# Patient Record
Sex: FEMALE | Race: WHITE | NOT HISPANIC OR LATINO
[De-identification: names, ages, dates, MRNs, and addresses within clinical notes are randomized per-mention and may not be internally consistent; named-entity substitution may affect disease eponyms.]

---

## 2019-12-18 ENCOUNTER — APPOINTMENT (OUTPATIENT)
Dept: INTERNAL MEDICINE | Facility: CLINIC | Age: 64
End: 2019-12-18
Payer: COMMERCIAL

## 2019-12-18 VITALS
DIASTOLIC BLOOD PRESSURE: 72 MMHG | SYSTOLIC BLOOD PRESSURE: 118 MMHG | HEART RATE: 67 BPM | WEIGHT: 182 LBS | BODY MASS INDEX: 31.07 KG/M2 | OXYGEN SATURATION: 99 % | HEIGHT: 64 IN

## 2019-12-18 DIAGNOSIS — Z82.49 FAMILY HISTORY OF ISCHEMIC HEART DISEASE AND OTHER DISEASES OF THE CIRCULATORY SYSTEM: ICD-10-CM

## 2019-12-18 DIAGNOSIS — Z82.5 FAMILY HISTORY OF ASTHMA AND OTHER CHRONIC LOWER RESPIRATORY DISEASES: ICD-10-CM

## 2019-12-18 DIAGNOSIS — Z82.3 FAMILY HISTORY OF STROKE: ICD-10-CM

## 2019-12-18 DIAGNOSIS — Z79.899 OTHER LONG TERM (CURRENT) DRUG THERAPY: ICD-10-CM

## 2019-12-18 PROCEDURE — 93000 ELECTROCARDIOGRAM COMPLETE: CPT

## 2019-12-18 PROCEDURE — G0442 ANNUAL ALCOHOL SCREEN 15 MIN: CPT

## 2019-12-18 PROCEDURE — 99386 PREV VISIT NEW AGE 40-64: CPT | Mod: 25

## 2019-12-18 PROCEDURE — G0444 DEPRESSION SCREEN ANNUAL: CPT

## 2019-12-18 NOTE — REVIEW OF SYSTEMS
[Patient Intake Form Reviewed] : Patient intake form was reviewed [Negative] : Heme/Lymph [Fatigue] : fatigue [Cough] : cough [de-identified] : numbness in hands from CTS (?chemo in the past) [FreeTextEntry6] : cough is occasional

## 2019-12-18 NOTE — ASSESSMENT
[FreeTextEntry1] : 1.  [facility] - Mammo, pap and colonoscopy are UTD.  Immunizations are UTD and she is awaiting the second Shingrix from her pharmacy.  She is turning 65 next month and will get a Prevnar then.  Uses seatbelts, has smoke detectors etc.  To remian active.\par 2.  HTN - BP is acceptable.  Continue current management.  Rx RN\par 3.  Hyperlipidemia - check lipids today.\par 4.  Numbness of fingers and toes on occasion - ?related to prior chemo, CTS.  Check TFT's\par 5.  MGUS - IgA.  Following at [facility].  Observation at this time.\par 6.  H/O ovarian Ca\par 7.  RTO 6 mos or prn.

## 2019-12-18 NOTE — HISTORY OF PRESENT ILLNESS
[de-identified] : 64-year-old female with a history of ovarian cancer diagnosed in 2/99 status post MALINA/BSO followed by 6 cycles of Taxol/carboplatinum, MGUS diagnosed 7/19, bilateral hip arthritis status post hip replacements, hyperlipidemia and hypertension who comes in today to establish care.  She has no specific complaints.  She is followed by a survivorship program in gynecology where she gets her mammograms done and a Ca 125 once a year.  She reports both of them were okay although the Ca 125 has trended up a little bit but it is still in the acceptable range.\par \par She went to heme-onc at [facility] in July where she had a bone marrow biopsy and a PET scan.  She was found to have a band that was on IgA band.  On bone marrow biopsy there was 8% cells which is under the criteria for calling it multiple myeloma.  She is following with them every 3 months.

## 2019-12-18 NOTE — HEALTH RISK ASSESSMENT
[Very Good] : ~his/her~  mood as very good [No falls in past year] : Patient reported no falls in the past year [Patient reported mammogram was normal] : Patient reported mammogram was normal [Hepatitis C test declined] : Hepatitis C test declined [HIV test declined] : HIV test declined [de-identified] : Exercising regularly [de-identified] : Occasional [] : No [PapSmearDate] : 4/19 [MammogramDate] : 11/19 [PapSmearComments] : Thinks it was normal [HIVComments] : Has been tested for HIV in the past

## 2019-12-19 ENCOUNTER — TRANSCRIPTION ENCOUNTER (OUTPATIENT)
Age: 64
End: 2019-12-19

## 2020-02-18 LAB
25(OH)D3 SERPL-MCNC: 21.2 NG/ML
ALBUMIN SERPL ELPH-MCNC: 4.7 G/DL
ALP BLD-CCNC: 81 U/L
ALT SERPL-CCNC: 38 U/L
ANION GAP SERPL CALC-SCNC: 13 MMOL/L
AST SERPL-CCNC: 30 U/L
BASOPHILS # BLD AUTO: 0.02 K/UL
BASOPHILS NFR BLD AUTO: 0.3 %
BILIRUB SERPL-MCNC: 0.6 MG/DL
BUN SERPL-MCNC: 18 MG/DL
CALCIUM SERPL-MCNC: 10.3 MG/DL
CHLORIDE SERPL-SCNC: 100 MMOL/L
CHOLEST SERPL-MCNC: 237 MG/DL
CHOLEST/HDLC SERPL: 2.4 RATIO
CO2 SERPL-SCNC: 26 MMOL/L
CREAT SERPL-MCNC: 0.78 MG/DL
EOSINOPHIL # BLD AUTO: 0.06 K/UL
EOSINOPHIL NFR BLD AUTO: 1 %
ESTIMATED AVERAGE GLUCOSE: 103 MG/DL
GLUCOSE SERPL-MCNC: 86 MG/DL
HBA1C MFR BLD HPLC: 5.2 %
HCT VFR BLD CALC: 43.5 %
HCV AB SER QL: NONREACTIVE
HCV S/CO RATIO: 0.08 S/CO
HDLC SERPL-MCNC: 97 MG/DL
HGB BLD-MCNC: 13.5 G/DL
IMM GRANULOCYTES NFR BLD AUTO: 0.2 %
LDLC SERPL CALC-MCNC: 124 MG/DL
LYMPHOCYTES # BLD AUTO: 2.4 K/UL
LYMPHOCYTES NFR BLD AUTO: 41.5 %
MAN DIFF?: NORMAL
MCHC RBC-ENTMCNC: 30.3 PG
MCHC RBC-ENTMCNC: 31 GM/DL
MCV RBC AUTO: 97.5 FL
MONOCYTES # BLD AUTO: 0.49 K/UL
MONOCYTES NFR BLD AUTO: 8.5 %
NEUTROPHILS # BLD AUTO: 2.8 K/UL
NEUTROPHILS NFR BLD AUTO: 48.5 %
PLATELET # BLD AUTO: 255 K/UL
POTASSIUM SERPL-SCNC: 4.6 MMOL/L
PROT SERPL-MCNC: 7.3 G/DL
RBC # BLD: 4.46 M/UL
RBC # FLD: 13.1 %
SODIUM SERPL-SCNC: 139 MMOL/L
T4 FREE SERPL-MCNC: 1.1 NG/DL
TRIGL SERPL-MCNC: 80 MG/DL
TSH SERPL-ACNC: 1.57 UIU/ML
WBC # FLD AUTO: 5.78 K/UL

## 2020-07-07 ENCOUNTER — APPOINTMENT (OUTPATIENT)
Dept: GASTROENTEROLOGY | Facility: CLINIC | Age: 65
End: 2020-07-07
Payer: COMMERCIAL

## 2020-07-07 VITALS
HEIGHT: 64 IN | HEART RATE: 71 BPM | WEIGHT: 186 LBS | SYSTOLIC BLOOD PRESSURE: 135 MMHG | TEMPERATURE: 98.3 F | BODY MASS INDEX: 31.76 KG/M2 | DIASTOLIC BLOOD PRESSURE: 82 MMHG

## 2020-07-07 DIAGNOSIS — K57.30 DIVERTICULOSIS OF LARGE INTESTINE W/OUT PERFORATION OR ABSCESS W/OUT BLEEDING: ICD-10-CM

## 2020-07-07 DIAGNOSIS — Z86.69 PERSONAL HISTORY OF OTHER DISEASES OF THE NERVOUS SYSTEM AND SENSE ORGANS: ICD-10-CM

## 2020-07-07 DIAGNOSIS — R19.8 OTHER SPECIFIED SYMPTOMS AND SIGNS INVOLVING THE DIGESTIVE SYSTEM AND ABDOMEN: ICD-10-CM

## 2020-07-07 DIAGNOSIS — Z86.010 PERSONAL HISTORY OF COLONIC POLYPS: ICD-10-CM

## 2020-07-07 DIAGNOSIS — Z80.3 FAMILY HISTORY OF MALIGNANT NEOPLASM OF BREAST: ICD-10-CM

## 2020-07-07 DIAGNOSIS — R12 HEARTBURN: ICD-10-CM

## 2020-07-07 DIAGNOSIS — K62.5 HEMORRHAGE OF ANUS AND RECTUM: ICD-10-CM

## 2020-07-07 DIAGNOSIS — Z85.43 PERSONAL HISTORY OF MALIGNANT NEOPLASM OF OVARY: ICD-10-CM

## 2020-07-07 DIAGNOSIS — M19.90 UNSPECIFIED OSTEOARTHRITIS, UNSPECIFIED SITE: ICD-10-CM

## 2020-07-07 PROCEDURE — 99203 OFFICE O/P NEW LOW 30 MIN: CPT

## 2020-07-07 PROCEDURE — 82274 ASSAY TEST FOR BLOOD FECAL: CPT | Mod: QW

## 2020-07-07 RX ORDER — CALCIUM CARBONATE 500 MG/1
TABLET, CHEWABLE ORAL
Refills: 0 | Status: ACTIVE | COMMUNITY

## 2020-07-07 RX ORDER — HYDROCHLOROTHIAZIDE 12.5 MG/1
12.5 TABLET ORAL
Qty: 36 | Refills: 3 | Status: DISCONTINUED | COMMUNITY
Start: 2019-12-18 | End: 2020-07-07

## 2020-07-07 NOTE — CONSULT LETTER
[Dear  ___] : Dear  [unfilled], [Consult Letter:] : I had the pleasure of evaluating your patient, [unfilled]. [Please see my note below.] : Please see my note below. [Consult Closing:] : Thank you very much for allowing me to participate in the care of this patient.  If you have any questions, please do not hesitate to contact me. [Sincerely,] : Sincerely, [FreeTextEntry3] : Matt Durant M.D.\par

## 2020-07-07 NOTE — PHYSICAL EXAM
[General Appearance - Alert] : alert [General Appearance - Well Nourished] : well nourished [General Appearance - In No Acute Distress] : in no acute distress [General Appearance - Well Developed] : well developed [Sclera] : the sclera and conjunctiva were normal [Neck Appearance] : the appearance of the neck was normal [Neck Cervical Mass (___cm)] : no neck mass was observed [Jugular Venous Distention Increased] : there was no jugular-venous distention [Thyroid Nodule] : there were no palpable thyroid nodules [Thyroid Diffuse Enlargement] : the thyroid was not enlarged [Heart Rate And Rhythm] : heart rate was normal and rhythm regular [Auscultation Breath Sounds / Voice Sounds] : lungs were clear to auscultation bilaterally [Heart Sounds Gallop] : no gallops [Heart Sounds] : normal S1 and S2 [Heart Sounds Pericardial Friction Rub] : no pericardial rub [Murmurs] : no murmurs [Edema] : there was no peripheral edema [Bowel Sounds] : normal bowel sounds [Full Pulse] : the pedal pulses are present [Abdomen Soft] : soft [Abdomen Tenderness] : non-tender [Abdomen Mass (___ Cm)] : no abdominal mass palpated [Abdomen Hernia] : no hernia was discovered [Normal Sphincter Tone] : normal sphincter tone [No Rectal Mass] : no rectal mass [Internal Hemorrhoid] : internal hemorrhoids [Supraclavicular Lymph Nodes Enlarged Bilaterally] : supraclavicular [Inguinal Lymph Nodes Enlarged Bilaterally] : inguinal [Femoral Lymph Nodes Enlarged Bilaterally] : femoral [Nail Clubbing] : no clubbing  or cyanosis of the fingernails [Skin Color & Pigmentation] : normal skin color and pigmentation [Skin Turgor] : normal skin turgor [] : no rash [Oriented To Time, Place, And Person] : oriented to person, place, and time [Impaired Insight] : insight and judgment were intact [Affect] : the affect was normal [External Hemorrhoid] : no external hemorrhoids [Occult Blood Positive] : stool was negative for occult blood [FreeTextEntry1] : FIT negative

## 2020-07-07 NOTE — REVIEW OF SYSTEMS
[Negative] : Heme/Lymph [As Noted in HPI] : as noted in HPI [Abdominal Pain] : abdominal pain [Diarrhea] : diarrhea

## 2020-07-07 NOTE — ASSESSMENT
[FreeTextEntry1] : 1.  Recent abdominal pain, diarrhea with bloody mucus suggests diagnosis of acute, self-limited colitis (favor ischemic>infection); tubular adenoma, sigmoid diverticulosis at last colonoscopy November 2013--rule out IBD, metachronous colorectal neoplasia.\par 2.  History of GERD; antral gastritis, mild reflux esophagitis at EGD October 2015.\par 3.  History of ovarian cancer (poorly differentiated clear-cell) 1999, status post total hysterectomy and chemotherapy.\par 4.  Hypertension.\par 5.  Obesity.\par 6.  Osteoarthritis, status post bilateral hip replacements.\par 7.  MGUS (IgA spike).\par 8.  Hypovitaminosis D.\par 9.  Hyperlipidemia.\par 10.  Status post iridectomy.\par 11.  Allergic to ampicillin, Bactrim.\par \par Plan:\par 1.  She will retrieve last week's labs for my review.\par 2.  Proceed with repeat colonoscopy-- Procedure, rationale, alternatives, material risks, anesthesia plan, and MiraLax prep instructions were reviewed and brochure given. Continue holding ASA for now.\par 3.  Continue Tums as needed.\par

## 2020-07-07 NOTE — HISTORY OF PRESENT ILLNESS
[FreeTextEntry1] : On 6/28, after eating a salad and shrimp burger, Estefanía noted bloating, and mid-abdominal cramping; then, she had a normal BM, followed by diarrhea with some blood and mucus.  She noted more blood and mucus the following morning, but since then, feels about back to baseline.  She has been noting bloating from time to time recently.  A small tubular adenoma was removed at last colonoscopy November 2013.  She had been taking aspirin 325 mg 3 times weekly for arthritis, but stopped this at onset of bleeding.  She reports heartburn only after ingesting red wine or tomato sauce, for which she would take Tums, with prompt relief.  She was diagnosed with MGUS (IgA spike) last year.  She denied associated fever or other family members who ate the same food being similarly affected.

## 2020-07-26 ENCOUNTER — APPOINTMENT (OUTPATIENT)
Dept: DISASTER EMERGENCY | Facility: CLINIC | Age: 65
End: 2020-07-26

## 2020-07-27 LAB — SARS-COV-2 N GENE NPH QL NAA+PROBE: NOT DETECTED

## 2020-07-29 ENCOUNTER — APPOINTMENT (OUTPATIENT)
Dept: GASTROENTEROLOGY | Facility: CLINIC | Age: 65
End: 2020-07-29
Payer: COMMERCIAL

## 2020-07-29 ENCOUNTER — LABORATORY RESULT (OUTPATIENT)
Age: 65
End: 2020-07-29

## 2020-07-29 PROCEDURE — 45378 DIAGNOSTIC COLONOSCOPY: CPT

## 2020-07-29 PROCEDURE — 43239 EGD BIOPSY SINGLE/MULTIPLE: CPT | Mod: 59

## 2021-02-08 ENCOUNTER — APPOINTMENT (OUTPATIENT)
Dept: INTERNAL MEDICINE | Facility: CLINIC | Age: 66
End: 2021-02-08
Payer: COMMERCIAL

## 2021-02-08 ENCOUNTER — NON-APPOINTMENT (OUTPATIENT)
Age: 66
End: 2021-02-08

## 2021-02-08 VITALS
BODY MASS INDEX: 31.71 KG/M2 | SYSTOLIC BLOOD PRESSURE: 110 MMHG | WEIGHT: 188 LBS | HEART RATE: 65 BPM | DIASTOLIC BLOOD PRESSURE: 70 MMHG | HEIGHT: 64.5 IN | OXYGEN SATURATION: 98 %

## 2021-02-08 DIAGNOSIS — E55.9 VITAMIN D DEFICIENCY, UNSPECIFIED: ICD-10-CM

## 2021-02-08 DIAGNOSIS — R07.89 OTHER CHEST PAIN: ICD-10-CM

## 2021-02-08 DIAGNOSIS — E66.9 OBESITY, UNSPECIFIED: ICD-10-CM

## 2021-02-08 DIAGNOSIS — Z00.00 ENCOUNTER FOR GENERAL ADULT MEDICAL EXAMINATION W/OUT ABNORMAL FINDINGS: ICD-10-CM

## 2021-02-08 DIAGNOSIS — R74.8 ABNORMAL LEVELS OF OTHER SERUM ENZYMES: ICD-10-CM

## 2021-02-08 DIAGNOSIS — Z92.21 PERSONAL HISTORY OF ANTINEOPLASTIC CHEMOTHERAPY: ICD-10-CM

## 2021-02-08 PROCEDURE — 99072 ADDL SUPL MATRL&STAF TM PHE: CPT

## 2021-02-08 PROCEDURE — 93000 ELECTROCARDIOGRAM COMPLETE: CPT | Mod: 59

## 2021-02-08 PROCEDURE — G0444 DEPRESSION SCREEN ANNUAL: CPT

## 2021-02-08 PROCEDURE — 99213 OFFICE O/P EST LOW 20 MIN: CPT | Mod: 25

## 2021-02-08 PROCEDURE — 99397 PER PM REEVAL EST PAT 65+ YR: CPT | Mod: 25

## 2021-02-08 RX ORDER — CLINDAMYCIN HYDROCHLORIDE 150 MG/1
150 CAPSULE ORAL
Qty: 20 | Refills: 0 | Status: ACTIVE | COMMUNITY
Start: 2019-10-01

## 2021-02-08 NOTE — HISTORY OF PRESENT ILLNESS
[de-identified] : 64-year-old female with a history of ovarian cancer diagnosed in 2/99 status post MALINA/BSO followed by 6 cycles of Taxol/carboplatinum, MGUS diagnosed 7/19, bilateral hip arthritis status post hip replacements, hyperlipidemia and hypertension who comes in today to establish care.  She has no specific complaints.  She is followed by a survivorship program in gynecology where she gets her mammograms done and a Ca 125 once a year.  She reports both of them were okay although the Ca 125 has trended up a little bit but it is still in the acceptable range.\par \par She went to heme-onc at [facility] in July, 2019 where she had a bone marrow biopsy and a PET scan.  She was found to have a band that was on IgA band.  On bone marrow biopsy there was 8% cells which is under the criteria for calling it multiple myeloma.  She is following with them every 3 months.\par \par Heme-onc did a bone density on her in November which showed osteoporosis.  She was on Fosamax in the past but did not tolerated as it gave her a lot of bone and muscle aches.  She is taking a calcium and vitamin D supplement and will speak with her oncologist in April about infusion treatments for the osteoporosis.  In addition, she chronically runs an elevated CPK.\par \par In addition, she has been having episodes of chest tightness and discomfort more like a pressure as if something was sitting on her chest.  This comes and goes and is not necessarily related to activity.  Sometimes she can go up a flight of stairs and will feel it but yesterday she shoveled and had no symptoms.

## 2021-02-08 NOTE — REVIEW OF SYSTEMS
[Patient Intake Form Reviewed] : Patient intake form was reviewed [Fatigue] : fatigue [Lower Ext Edema] : lower extremity edema [Cough] : no cough [Negative] : Respiratory [FreeTextEntry5] : See HPI [FreeTextEntry9] : Feels achy.  Feels pain in her medial epicondyles region. [de-identified] : numbness in hands from CTS (?chemo in the past)

## 2021-02-08 NOTE — HEALTH RISK ASSESSMENT
[Very Good] : ~his/her~  mood as very good [No falls in past year] : Patient reported no falls in the past year [Patient reported mammogram was normal] : Patient reported mammogram was normal [HIV test declined] : HIV test declined [Hepatitis C test declined] : Hepatitis C test declined [With Family] : lives with family [None] : None [Employed] : employed [Fully functional (bathing, dressing, toileting, transferring, walking, feeding)] : Fully functional (bathing, dressing, toileting, transferring, walking, feeding) [Fully functional (using the telephone, shopping, preparing meals, housekeeping, doing laundry, using] : Fully functional and needs no help or supervision to perform IADLs (using the telephone, shopping, preparing meals, housekeeping, doing laundry, using transportation, managing medications and managing finances) [Smoke Detector] : smoke detector [Carbon Monoxide Detector] : carbon monoxide detector [Seat Belt] :  uses seat belt [Name: ___] : Health Care Proxy's Name: [unfilled]  [Relationship: ___] : Relationship: [unfilled] [] : No [No] : In the past 12 months have you used drugs other than those required for medical reasons? No [1] : 1) Little interest or pleasure doing things for several days (1) [0] : 2) Feeling down, depressed, or hopeless: Not at all (0) [de-identified] : Heme/unk, GI.  GI records were read and reviewed.  Recent bone density and mammogram and labs from October reviewed that were done at [facility]. [de-identified] : Occasional [de-identified] : Not Exercising regularly - in a funk,  Gets bored.  Working from home and taking care of her mom. [[id number]] : 1 [Patient reported bone density results were abnormal] : Patient reported bone density results were abnormal [Reports changes in hearing] : Reports no changes in hearing [Reports changes in vision] : Reports no changes in vision [Reports changes in dental health] : Reports no changes in dental health [Guns at Home] : no guns at home [Sunscreen] : does not use sunscreen [MammogramDate] : 11/20 [MammogramComments] : BI-RADS 2. [PapSmearDate] : 4/19 [PapSmearComments] : Thinks it was normal [BoneDensityDate] : 11/20 [BoneDensityComments] : Osteoporosis [HIVComments] : Has been tested for HIV in the past [FreeTextEntry2] :  [facility] [AdvancecareDate] : 02/21 [de-identified] : Is due for an eye exam

## 2021-02-08 NOTE — ASSESSMENT
[FreeTextEntry1] : 1.  [facility] - Mammo, pap, BMD and colonoscopy are UTD.  Immunizations are UTD and she is awaiting the second Covid vaccine which is scheduled for tomorrow..  She will need a booster for her Pneumovax but will defer at this time because of the Covid vaccination.  Had a flu shot in October.  Uses seatbelts, has smoke detectors etc.  To remian active.\par 2.  HTN - BP is acceptable.  Continue current management.  Rx RN\par 3.  Hyperlipidemia - check lipids today.\par 4.  Numbness of fingers and toes on occasion - ?related to prior chemo, CTS.  Check TFT's\par 5.  MGUS - IgA.  Following at [facility].  Observation at this time.\par 6.  H/O ovarian Ca now with episodes of chest discomfort and heaviness tightness.  EKG normal but she has had chemotherapy in the past.  Will send for a stress echo for further evaluation.  Premature coronary artery disease has been seen in individuals who has had chemotherapy, especially with anthracycline derivatives.\par 7.  Osteoporosis on calcium and vitamin D.  Did not tolerate a bisphosphonate in the past and thinks she will decline request or other types of treatment.  Requesting that I check her CK.  She will discuss further with her oncologist.\par 8.  Labs as per plan\par 9.  RTO 6 mos or prn.

## 2021-02-08 NOTE — PHYSICAL EXAM
[No Acute Distress] : no acute distress [Well Nourished] : well nourished [Well Developed] : well developed [Well-Appearing] : well-appearing [Normal Sclera/Conjunctiva] : normal sclera/conjunctiva [PERRL] : pupils equal round and reactive to light [EOMI] : extraocular movements intact [Normal Outer Ear/Nose] : the outer ears and nose were normal in appearance [No JVD] : no jugular venous distention [No Lymphadenopathy] : no lymphadenopathy [Supple] : supple [Thyroid Normal, No Nodules] : the thyroid was normal and there were no nodules present [No Respiratory Distress] : no respiratory distress  [No Accessory Muscle Use] : no accessory muscle use [Clear to Auscultation] : lungs were clear to auscultation bilaterally [Normal Rate] : normal rate  [Regular Rhythm] : with a regular rhythm [Normal S1, S2] : normal S1 and S2 [No Murmur] : no murmur heard [No Carotid Bruits] : no carotid bruits [No Abdominal Bruit] : a ~M bruit was not heard ~T in the abdomen [No Varicosities] : no varicosities [Pedal Pulses Present] : the pedal pulses are present [No Edema] : there was no peripheral edema [No Palpable Aorta] : no palpable aorta [No Extremity Clubbing/Cyanosis] : no extremity clubbing/cyanosis [No Nipple Discharge] : no nipple discharge [Non Tender] : non-tender [Soft] : abdomen soft [Non-distended] : non-distended [No Masses] : no abdominal mass palpated [No HSM] : no HSM [Normal Bowel Sounds] : normal bowel sounds [Normal Supraclavicular Nodes] : no supraclavicular lymphadenopathy [Normal Posterior Cervical Nodes] : no posterior cervical lymphadenopathy [Normal Anterior Cervical Nodes] : no anterior cervical lymphadenopathy [No CVA Tenderness] : no CVA  tenderness [No Spinal Tenderness] : no spinal tenderness [No Joint Swelling] : no joint swelling [Grossly Normal Strength/Tone] : grossly normal strength/tone [No Rash] : no rash [Coordination Grossly Intact] : coordination grossly intact [No Focal Deficits] : no focal deficits [Normal Gait] : normal gait [Deep Tendon Reflexes (DTR)] : deep tendon reflexes were 2+ and symmetric [Normal Affect] : the affect was normal [Alert and Oriented x3] : oriented to person, place, and time [Normal Insight/Judgement] : insight and judgment were intact

## 2021-02-17 LAB
25(OH)D3 SERPL-MCNC: 24 NG/ML
ALBUMIN SERPL ELPH-MCNC: 4.7 G/DL
ALP BLD-CCNC: 81 U/L
ALT SERPL-CCNC: 27 U/L
ANION GAP SERPL CALC-SCNC: 12 MMOL/L
AST SERPL-CCNC: 24 U/L
BILIRUB SERPL-MCNC: 0.6 MG/DL
BUN SERPL-MCNC: 19 MG/DL
CALCIUM SERPL-MCNC: 9.8 MG/DL
CHLORIDE SERPL-SCNC: 103 MMOL/L
CHOLEST SERPL-MCNC: 222 MG/DL
CK SERPL-CCNC: 330 U/L
CO2 SERPL-SCNC: 24 MMOL/L
CREAT SERPL-MCNC: 0.81 MG/DL
ESTIMATED AVERAGE GLUCOSE: 105 MG/DL
GLUCOSE SERPL-MCNC: 99 MG/DL
HBA1C MFR BLD HPLC: 5.3 %
HDLC SERPL-MCNC: 95 MG/DL
LDLC SERPL CALC-MCNC: 112 MG/DL
NONHDLC SERPL-MCNC: 127 MG/DL
POTASSIUM SERPL-SCNC: 4.3 MMOL/L
PROT SERPL-MCNC: 7.1 G/DL
SODIUM SERPL-SCNC: 139 MMOL/L
T4 FREE SERPL-MCNC: 1 NG/DL
TRIGL SERPL-MCNC: 74 MG/DL
TSH SERPL-ACNC: 1.91 UIU/ML

## 2021-03-07 ENCOUNTER — APPOINTMENT (OUTPATIENT)
Dept: DISASTER EMERGENCY | Facility: CLINIC | Age: 66
End: 2021-03-07

## 2021-03-08 LAB — SARS-COV-2 N GENE NPH QL NAA+PROBE: NOT DETECTED

## 2021-03-10 ENCOUNTER — APPOINTMENT (OUTPATIENT)
Dept: CV DIAGNOSITCS | Facility: HOSPITAL | Age: 66
End: 2021-03-10

## 2021-03-10 ENCOUNTER — OUTPATIENT (OUTPATIENT)
Dept: OUTPATIENT SERVICES | Facility: HOSPITAL | Age: 66
LOS: 1 days | End: 2021-03-10
Payer: COMMERCIAL

## 2021-03-10 DIAGNOSIS — R07.89 OTHER CHEST PAIN: ICD-10-CM

## 2021-03-10 DIAGNOSIS — Z92.21 PERSONAL HISTORY OF ANTINEOPLASTIC CHEMOTHERAPY: ICD-10-CM

## 2021-03-10 PROCEDURE — 93325 DOPPLER ECHO COLOR FLOW MAPG: CPT | Mod: 26

## 2021-03-10 PROCEDURE — 93320 DOPPLER ECHO COMPLETE: CPT | Mod: 26

## 2021-03-10 PROCEDURE — 93350 STRESS TTE ONLY: CPT | Mod: 26

## 2021-03-10 PROCEDURE — 93320 DOPPLER ECHO COMPLETE: CPT

## 2021-03-10 PROCEDURE — 93325 DOPPLER ECHO COLOR FLOW MAPG: CPT

## 2021-03-10 PROCEDURE — 93351 STRESS TTE COMPLETE: CPT

## 2021-08-24 DIAGNOSIS — N39.0 URINARY TRACT INFECTION, SITE NOT SPECIFIED: ICD-10-CM

## 2021-08-24 DIAGNOSIS — R39.15 URGENCY OF URINATION: ICD-10-CM

## 2021-08-24 DIAGNOSIS — R10.13 EPIGASTRIC PAIN: ICD-10-CM

## 2021-08-25 LAB
APPEARANCE: CLEAR
BACTERIA UR CULT: NORMAL
BACTERIA: NEGATIVE
BILIRUBIN URINE: NEGATIVE
BLOOD URINE: NEGATIVE
COLOR: YELLOW
GLUCOSE QUALITATIVE U: NEGATIVE
HYALINE CASTS: 0 /LPF
KETONES URINE: NEGATIVE
LEUKOCYTE ESTERASE URINE: ABNORMAL
MICROSCOPIC-UA: NORMAL
NITRITE URINE: NEGATIVE
PH URINE: 6
PROTEIN URINE: NORMAL
RED BLOOD CELLS URINE: 2 /HPF
SPECIFIC GRAVITY URINE: 1.02
SQUAMOUS EPITHELIAL CELLS: 1 /HPF
UROBILINOGEN URINE: NORMAL
WHITE BLOOD CELLS URINE: 8 /HPF

## 2021-08-26 ENCOUNTER — APPOINTMENT (OUTPATIENT)
Dept: INTERNAL MEDICINE | Facility: CLINIC | Age: 66
End: 2021-08-26

## 2021-09-14 ENCOUNTER — TRANSCRIPTION ENCOUNTER (OUTPATIENT)
Age: 66
End: 2021-09-14

## 2021-10-30 ENCOUNTER — RX RENEWAL (OUTPATIENT)
Age: 66
End: 2021-10-30

## 2022-08-25 ENCOUNTER — NON-APPOINTMENT (OUTPATIENT)
Age: 67
End: 2022-08-25

## 2022-08-25 ENCOUNTER — APPOINTMENT (OUTPATIENT)
Dept: INTERNAL MEDICINE | Facility: CLINIC | Age: 67
End: 2022-08-25

## 2022-08-25 DIAGNOSIS — E78.5 HYPERLIPIDEMIA, UNSPECIFIED: ICD-10-CM

## 2022-08-25 DIAGNOSIS — Z13.31 ENCOUNTER FOR SCREENING FOR DEPRESSION: ICD-10-CM

## 2022-08-25 DIAGNOSIS — D47.2 MONOCLONAL GAMMOPATHY: ICD-10-CM

## 2022-08-25 DIAGNOSIS — Z23 ENCOUNTER FOR IMMUNIZATION: ICD-10-CM

## 2022-08-25 DIAGNOSIS — M81.0 AGE-RELATED OSTEOPOROSIS W/OUT CURRENT PATHOLOGICAL FRACTURE: ICD-10-CM

## 2022-08-25 DIAGNOSIS — R53.83 OTHER FATIGUE: ICD-10-CM

## 2022-08-25 DIAGNOSIS — M25.50 PAIN IN UNSPECIFIED JOINT: ICD-10-CM

## 2022-08-25 DIAGNOSIS — Z00.00 ENCOUNTER FOR GENERAL ADULT MEDICAL EXAMINATION W/OUT ABNORMAL FINDINGS: ICD-10-CM

## 2022-08-25 DIAGNOSIS — Z01.818 ENCOUNTER FOR OTHER PREPROCEDURAL EXAMINATION: ICD-10-CM

## 2022-08-25 DIAGNOSIS — I10 ESSENTIAL (PRIMARY) HYPERTENSION: ICD-10-CM

## 2022-08-25 DIAGNOSIS — Z13.39 ENCOUNTER FOR SCREENING EXAM FOR OTHER MENTAL HEALTH AND BEHAVIORAL DISORDERS: ICD-10-CM

## 2022-08-25 PROCEDURE — 93000 ELECTROCARDIOGRAM COMPLETE: CPT | Mod: 59

## 2022-08-25 PROCEDURE — 99213 OFFICE O/P EST LOW 20 MIN: CPT | Mod: 25

## 2022-08-25 PROCEDURE — G0439: CPT

## 2022-08-25 PROCEDURE — 90677 PCV20 VACCINE IM: CPT

## 2022-08-25 PROCEDURE — G0009: CPT

## 2022-08-25 PROCEDURE — G0444 DEPRESSION SCREEN ANNUAL: CPT | Mod: 59

## 2022-08-25 RX ORDER — NITROFURANTOIN (MONOHYDRATE/MACROCRYSTALS) 25; 75 MG/1; MG/1
100 CAPSULE ORAL
Qty: 10 | Refills: 0 | Status: DISCONTINUED | COMMUNITY
Start: 2021-08-24 | End: 2022-08-25

## 2022-08-25 NOTE — HEALTH RISK ASSESSMENT
[Very Good] : ~his/her~  mood as very good [No] : In the past 12 months have you used drugs other than those required for medical reasons? No [No falls in past year] : Patient reported no falls in the past year [Patient reported mammogram was normal] : Patient reported mammogram was normal [Patient reported bone density results were abnormal] : Patient reported bone density results were abnormal [HIV test declined] : HIV test declined [Hepatitis C test declined] : Hepatitis C test declined [None] : None [With Family] : lives with family [Employed] : employed [Fully functional (bathing, dressing, toileting, transferring, walking, feeding)] : Fully functional (bathing, dressing, toileting, transferring, walking, feeding) [Fully functional (using the telephone, shopping, preparing meals, housekeeping, doing laundry, using] : Fully functional and needs no help or supervision to perform IADLs (using the telephone, shopping, preparing meals, housekeeping, doing laundry, using transportation, managing medications and managing finances) [Smoke Detector] : smoke detector [Carbon Monoxide Detector] : carbon monoxide detector [Seat Belt] :  uses seat belt [Never] : Never [1] : 2) Feeling down, depressed, or hopeless for several days (1) [PHQ-9 Positive] : PHQ-9 Positive [With Patient/Caregiver] : , with patient/caregiver [Designated Healthcare Proxy] : Designated healthcare proxy [Name: ___] : Health Care Proxy's Name: [unfilled]  [Relationship: ___] : Relationship: [unfilled] [de-identified] : Heme/unk, GI.  GI records were read and reviewed.  Recent bone density and mammogram and labs from October reviewed that were done at [facility]. [de-identified] : Occasional [de-identified] : Not Exercising regularly [Reports changes in hearing] : Reports no changes in hearing [Reports changes in vision] : Reports no changes in vision [Reports changes in dental health] : Reports no changes in dental health [Guns at Home] : no guns at home [Sunscreen] : does not use sunscreen [MammogramDate] : 11/21 [MammogramComments] : BI-RADS 2. [PapSmearDate] : 4/19 [PapSmearComments] : Thinks it was normal [BoneDensityDate] : 11/20 [BoneDensityComments] : Osteoporosis [HIVComments] : Has been tested for HIV in the past [FreeTextEntry2] :  [facility] [de-identified] : Is due for an eye exam [AdvancecareDate] : 08/22

## 2022-08-25 NOTE — COUNSELING
[Encouraged to increase physical activity] : Encouraged to increase physical activity [FreeTextEntry2] : She feels she is overeating because she cooks for her mother.

## 2022-08-25 NOTE — ASSESSMENT
[FreeTextEntry1] : 1.  [facility] - Mammo, pap, BMD and colonoscopy are UTD.  COVID vaccine is UTD.  She needs a Prevnar today.  Uses seatbelts, has smoke detectors etc.  To remain active.\par 2.  HTN - BP is acceptable.  Continue current management.  Rx RN\par 3.  Hyperlipidemia - check lipids today.\par 4.  Joint pain with early morning pain and stiffness - r/o inflammatory arthropathy.\par 5.  MGUS - IgA.  [facility]  Observation at this time.\par 6.  H/O ovarian Ca now with episodes of chest discomfort and heaviness tightness.  EKG normal but she has had chemotherapy in the past.  Will send for a stress echo for further evaluation.  Premature coronary artery disease has been seen in individuals who has had chemotherapy, especially with anthracycline derivatives.\par 7.  Osteoporosis on calcium and vitamin D.  Did not tolerate a bisphosphonate in the past and thinks she will decline request or other types of treatment.  Discussed Prolia.  To consider endo referral.\par 8.  Increased weight - to increase exercise as tolerated.\par 9.  Depression - mild and situational.  Consider meds\par 10.  Fatigue likely multifactorial and caregiver burnout.  Consider sleep study although she does not think that she has JENN\par 11.  Labs as per plan\par 12.  RTO 6 mos or prn.

## 2022-08-25 NOTE — PHYSICAL EXAM
[No Acute Distress] : no acute distress [Well Nourished] : well nourished [Well Developed] : well developed [Well-Appearing] : well-appearing [Normal Sclera/Conjunctiva] : normal sclera/conjunctiva [PERRL] : pupils equal round and reactive to light [EOMI] : extraocular movements intact [Normal Outer Ear/Nose] : the outer ears and nose were normal in appearance [No JVD] : no jugular venous distention [No Lymphadenopathy] : no lymphadenopathy [Supple] : supple [Thyroid Normal, No Nodules] : the thyroid was normal and there were no nodules present [No Respiratory Distress] : no respiratory distress  [No Accessory Muscle Use] : no accessory muscle use [Clear to Auscultation] : lungs were clear to auscultation bilaterally [Normal Rate] : normal rate  [Regular Rhythm] : with a regular rhythm [Normal S1, S2] : normal S1 and S2 [No Murmur] : no murmur heard [No Carotid Bruits] : no carotid bruits [No Abdominal Bruit] : a ~M bruit was not heard ~T in the abdomen [No Varicosities] : no varicosities [Pedal Pulses Present] : the pedal pulses are present [No Edema] : there was no peripheral edema [No Palpable Aorta] : no palpable aorta [No Extremity Clubbing/Cyanosis] : no extremity clubbing/cyanosis [No Nipple Discharge] : no nipple discharge [Soft] : abdomen soft [Non Tender] : non-tender [Non-distended] : non-distended [No Masses] : no abdominal mass palpated [No HSM] : no HSM [Normal Bowel Sounds] : normal bowel sounds [Normal Supraclavicular Nodes] : no supraclavicular lymphadenopathy [Normal Posterior Cervical Nodes] : no posterior cervical lymphadenopathy [Normal Anterior Cervical Nodes] : no anterior cervical lymphadenopathy [No CVA Tenderness] : no CVA  tenderness [No Spinal Tenderness] : no spinal tenderness [No Joint Swelling] : no joint swelling [Grossly Normal Strength/Tone] : grossly normal strength/tone [No Rash] : no rash [Coordination Grossly Intact] : coordination grossly intact [No Focal Deficits] : no focal deficits [Normal Gait] : normal gait [Deep Tendon Reflexes (DTR)] : deep tendon reflexes were 2+ and symmetric [Normal Affect] : the affect was normal [Alert and Oriented x3] : oriented to person, place, and time [Normal Insight/Judgement] : insight and judgment were intact

## 2022-08-25 NOTE — HISTORY OF PRESENT ILLNESS
[de-identified] : 67-year-old female with a history of ovarian cancer diagnosed in 2/99 status post MALINA/BSO followed by 6 cycles of Taxol/carboplatinum, MGUS diagnosed 7/19, bilateral hip arthritis status post hip replacements, hyperlipidemia and hypertension who comes in today for a comprehensive evaluation.  She is feeling a little depressed and agitated at times. She also has joint pains in her hands that lasts up to an hour in the mornings and recurs in the evenings.  She is followed by a survivorship program in gynecology where she gets her mammograms done and a Ca 125 once a year.  She reports both of them were okay although the Ca 125 has trended up a little bit but it is still in the acceptable range.\par \par She is taking care of her 92 yo mother and she is very tired.  She is retired x 1 year now.  Is not taking care of herself the way that she should.  Not really exercising and she is overeating.  \par \par She went to heme-onc at [facility] in July, 2019 where she had a bone marrow biopsy and a PET scan.  She was found to have a band that was on IgA band.  On bone marrow biopsy there was 8% cells which is under the criteria for calling it multiple myeloma.  She is following with them every 6 mos as it has been stable..\par \par Heme-onc did a bone density on her in November, 20 which showed osteoporosis.  She was on Fosamax in the past but did not tolerated as it gave her a lot of bone and muscle aches.  She is taking a calcium and vitamin D supplement and will speak with her oncologist in April about infusion treatments for the osteoporosis.  In addition, she chronically runs an elevated CPK.\par \par In addition, she has been having episodes of chest tightness and discomfort more like a pressure as if something was sitting on her chest.  This comes and goes and is not necessarily related to activity.  Sometimes she can go up a flight of stairs and will feel it but yesterday she shoveled and had no symptoms.\par \par Hands hurt in the morning and evening.  Pain and stiffness last about an hour.

## 2022-10-13 PROBLEM — Z13.31 SCREENING FOR DEPRESSION: Status: ACTIVE | Noted: 2019-12-18

## 2022-11-22 ENCOUNTER — TRANSCRIPTION ENCOUNTER (OUTPATIENT)
Age: 67
End: 2022-11-22

## 2022-11-22 LAB
25(OH)D3 SERPL-MCNC: 17.1 NG/ML
ALBUMIN SERPL ELPH-MCNC: 5 G/DL
ALP BLD-CCNC: 81 U/L
ALT SERPL-CCNC: 30 U/L
ANACR T: NEGATIVE
ANION GAP SERPL CALC-SCNC: 14 MMOL/L
AST SERPL-CCNC: 29 U/L
BASOPHILS # BLD AUTO: 0.04 K/UL
BASOPHILS NFR BLD AUTO: 0.5 %
BILIRUB SERPL-MCNC: 0.9 MG/DL
BUN SERPL-MCNC: 17 MG/DL
CALCIUM SERPL-MCNC: 10.4 MG/DL
CCP AB SER IA-ACNC: <8 UNITS
CHLORIDE SERPL-SCNC: 102 MMOL/L
CHOLEST SERPL-MCNC: 243 MG/DL
CO2 SERPL-SCNC: 23 MMOL/L
CREAT SERPL-MCNC: 0.74 MG/DL
EGFR: 89 ML/MIN/1.73M2
EOSINOPHIL # BLD AUTO: 0.06 K/UL
EOSINOPHIL NFR BLD AUTO: 0.8 %
ERYTHROCYTE [SEDIMENTATION RATE] IN BLOOD BY WESTERGREN METHOD: 21 MM/HR
ESTIMATED AVERAGE GLUCOSE: 103 MG/DL
GLUCOSE SERPL-MCNC: 91 MG/DL
HBA1C MFR BLD HPLC: 5.2 %
HCT VFR BLD CALC: 40.2 %
HDLC SERPL-MCNC: 92 MG/DL
HGB BLD-MCNC: 13.2 G/DL
IMM GRANULOCYTES NFR BLD AUTO: 0.1 %
LDLC SERPL CALC-MCNC: 134 MG/DL
LYMPHOCYTES # BLD AUTO: 3.21 K/UL
LYMPHOCYTES NFR BLD AUTO: 42.2 %
MAN DIFF?: NORMAL
MCHC RBC-ENTMCNC: 31.2 PG
MCHC RBC-ENTMCNC: 32.8 GM/DL
MCV RBC AUTO: 95 FL
MONOCYTES # BLD AUTO: 0.61 K/UL
MONOCYTES NFR BLD AUTO: 8 %
NEUTROPHILS # BLD AUTO: 3.67 K/UL
NEUTROPHILS NFR BLD AUTO: 48.4 %
NONHDLC SERPL-MCNC: 151 MG/DL
PLATELET # BLD AUTO: 240 K/UL
POTASSIUM SERPL-SCNC: 4.7 MMOL/L
PROT SERPL-MCNC: 7.4 G/DL
RBC # BLD: 4.23 M/UL
RBC # FLD: 13.5 %
RF+CCP IGG SER-IMP: NEGATIVE
RHEUMATOID FACT SER QL: <10 IU/ML
SODIUM SERPL-SCNC: 139 MMOL/L
T4 FREE SERPL-MCNC: 1.1 NG/DL
TRIGL SERPL-MCNC: 86 MG/DL
TSH SERPL-ACNC: 1.48 UIU/ML
WBC # FLD AUTO: 7.6 K/UL

## 2022-11-29 ENCOUNTER — TRANSCRIPTION ENCOUNTER (OUTPATIENT)
Age: 67
End: 2022-11-29

## 2023-01-27 ENCOUNTER — NON-APPOINTMENT (OUTPATIENT)
Age: 68
End: 2023-01-27

## 2023-02-16 ENCOUNTER — APPOINTMENT (OUTPATIENT)
Dept: OPHTHALMOLOGY | Facility: CLINIC | Age: 68
End: 2023-02-16
Payer: MEDICARE

## 2023-02-16 ENCOUNTER — NON-APPOINTMENT (OUTPATIENT)
Age: 68
End: 2023-02-16

## 2023-02-16 ENCOUNTER — APPOINTMENT (OUTPATIENT)
Dept: OPHTHALMOLOGY | Facility: CLINIC | Age: 68
End: 2023-02-16
Payer: SELF-PAY

## 2023-02-16 PROCEDURE — 99204 OFFICE O/P NEW MOD 45 MIN: CPT

## 2023-02-16 PROCEDURE — ZZZZZ: CPT

## 2023-02-16 PROCEDURE — 92015 DETERMINE REFRACTIVE STATE: CPT

## 2023-03-15 ENCOUNTER — TRANSCRIPTION ENCOUNTER (OUTPATIENT)
Age: 68
End: 2023-03-15

## 2023-03-15 RX ORDER — LISINOPRIL 10 MG/1
10 TABLET ORAL
Qty: 90 | Refills: 3 | Status: ACTIVE | COMMUNITY
Start: 2019-12-18 | End: 1900-01-01

## 2023-06-26 NOTE — REVIEW OF SYSTEMS
37.1 [Patient Intake Form Reviewed] : Patient intake form was reviewed [Fatigue] : fatigue [Lower Ext Edema] : lower extremity edema [Negative] : Heme/Lymph [Cough] : no cough [FreeTextEntry5] : See HPI [FreeTextEntry9] : Feels achy.  Feels pain in her medial epicondyles region. [de-identified] : numbness in hands from CTS (?chemo in the past)

## 2023-10-04 ENCOUNTER — APPOINTMENT (OUTPATIENT)
Dept: OPHTHALMOLOGY | Facility: CLINIC | Age: 68
End: 2023-10-04
Payer: MEDICARE

## 2023-10-04 ENCOUNTER — NON-APPOINTMENT (OUTPATIENT)
Age: 68
End: 2023-10-04

## 2023-10-04 ENCOUNTER — APPOINTMENT (OUTPATIENT)
Dept: OPHTHALMOLOGY | Facility: CLINIC | Age: 68
End: 2023-10-04
Payer: SELF-PAY

## 2023-10-04 PROCEDURE — 92014 COMPRE OPH EXAM EST PT 1/>: CPT

## 2023-10-04 PROCEDURE — 92015 DETERMINE REFRACTIVE STATE: CPT

## 2023-12-21 ENCOUNTER — NON-APPOINTMENT (OUTPATIENT)
Age: 68
End: 2023-12-21

## 2023-12-21 ENCOUNTER — APPOINTMENT (OUTPATIENT)
Dept: ORTHOPEDIC SURGERY | Facility: CLINIC | Age: 68
End: 2023-12-21
Payer: MEDICARE

## 2023-12-21 VITALS — WEIGHT: 175 LBS | BODY MASS INDEX: 29.16 KG/M2 | HEIGHT: 65 IN

## 2023-12-21 PROCEDURE — 99203 OFFICE O/P NEW LOW 30 MIN: CPT

## 2023-12-21 NOTE — PHYSICAL EXAM
[de-identified] : - Constitutional: This is a female in no obvious distress.   - Psych: Patient is alert and oriented to person, place and time.  Patient has a normal mood and affect. - Cardiovascular: Normal pulses throughout the upper extremities.  No significant varicosities are noted in the upper extremities.  - Neuro: Strength and sensation are intact throughout the upper extremities.  Patient has normal coordination. - Respiratory:  Patient exhibits no evidence of shortness of breath or difficulty breathing. - Skin: No rashes, lesions, or other abnormalities are noted in the upper extremities. ---  Examination of her right hand and upper extremity demonstrates obvious first dorsal interosseous wasting.  She has decreased sensation to light touch along the ulnar nerve distribution with normal sensation along the radial and median nerve distributions.  She has obvious weakness of the ulnar nerve innervated intrinsics within the hand.  Provocative signs for cubital tunnel syndrome are partially positive.  There is no instability of the ulnar nerve with flexion and extension of the elbow.  Provocative signs for carpal tunnel syndrome are negative.

## 2023-12-21 NOTE — END OF VISIT
[FreeTextEntry3] : This note was written by Adrienne Martinez on 12/21/2023 acting solely as a scribe for Dr. Damon Sidhu.   All medical record entries made by the Scribe were at my, Dr. Damon Sidhu, direction and personally dictated by me on 12/21/2023. I have personally reviewed the chart and agree that the record accurately reflects my personal performance of the history, physical exam, assessment and plan.

## 2023-12-21 NOTE — DISCUSSION/SUMMARY
[FreeTextEntry1] : She has findings consistent with advanced right cubital tunnel syndrome.  She also has pain at her cervical spine with a possible double crush syndrome.  I had a discussion with the patient regarding today's visit, the prognosis of this diagnosis, and treatment recommendations and options. At this time, I recommended I recommended an EMG to evaluate for cubital tunnel syndrome and a possible concomitant cervical radiculopathy. She will follow up after her EMG to review the results and discuss treatment recommendations.   The patient has agreed to the above plan of management and has expressed full understanding.  All questions were fully answered to the patient's satisfaction.  My cumulative time spent on this visit included: Preparation for the visit, review of the medical records, review of pertinent diagnostic studies, examination and counseling of the patient on the above diagnosis, treatment plan and prognosis, orders of diagnostic tests, medication and/or appropriate procedures and documentation in the medical records of today's visit.

## 2023-12-21 NOTE — ADDENDUM
[FreeTextEntry1] : I, Adrienne Martinez, acted solely as a scribe for Dr. Sidhu on this date on 12/21/2023.

## 2023-12-21 NOTE — HISTORY OF PRESENT ILLNESS
[Right] : right hand dominant [FreeTextEntry1] : right numbness and tingling localized to little finger and elbow, x weeks. She reports a loss of strength and functionality, with episodes of dropping items, and difficulty opening bottles. She also reports cervical pain that seems to have begun at the same time.  Of note, she was diagnosed with bilateral carpal tunnel syndrome in 2006.   I reviewed EMGs dated 2006 which demonstrates: mild myopathic process with minimal denervation. Evidence for superimposed mild and chronic median neuropathies across both wrists (CTS) and a mild and chronic ulnar neuropathy across the right elbows. There is no definite evidence for a sensorimotor polyneuropathy or radiculopathy on this study.  Of note, she has a history of ovarian cancer, 20 years ago.

## 2024-01-12 ENCOUNTER — APPOINTMENT (OUTPATIENT)
Dept: NEUROLOGY | Facility: CLINIC | Age: 69
End: 2024-01-12
Payer: MEDICARE

## 2024-01-12 VITALS
OXYGEN SATURATION: 99 % | SYSTOLIC BLOOD PRESSURE: 157 MMHG | BODY MASS INDEX: 29.16 KG/M2 | HEART RATE: 70 BPM | HEIGHT: 65 IN | TEMPERATURE: 97.7 F | WEIGHT: 175 LBS | DIASTOLIC BLOOD PRESSURE: 87 MMHG

## 2024-01-12 DIAGNOSIS — G56.03 CARPAL TUNNEL SYNDROM,BILATERAL UPPER LIMBS: ICD-10-CM

## 2024-01-12 PROCEDURE — 95912 NRV CNDJ TEST 11-12 STUDIES: CPT

## 2024-01-12 PROCEDURE — 95886 MUSC TEST DONE W/N TEST COMP: CPT

## 2024-01-17 ENCOUNTER — TRANSCRIPTION ENCOUNTER (OUTPATIENT)
Age: 69
End: 2024-01-17

## 2024-01-17 PROBLEM — G56.03 BILATERAL CARPAL TUNNEL SYNDROME: Status: ACTIVE | Noted: 2024-01-17

## 2024-01-19 ENCOUNTER — APPOINTMENT (OUTPATIENT)
Dept: ORTHOPEDIC SURGERY | Facility: CLINIC | Age: 69
End: 2024-01-19
Payer: MEDICARE

## 2024-01-19 DIAGNOSIS — R20.2 ANESTHESIA OF SKIN: ICD-10-CM

## 2024-01-19 DIAGNOSIS — R20.0 ANESTHESIA OF SKIN: ICD-10-CM

## 2024-01-19 PROCEDURE — 99215 OFFICE O/P EST HI 40 MIN: CPT

## 2024-01-19 RX ORDER — DIAZEPAM 5 MG/1
5 TABLET ORAL
Qty: 2 | Refills: 0 | Status: ACTIVE | COMMUNITY
Start: 2024-01-19 | End: 1900-01-01

## 2024-01-19 RX ORDER — GABAPENTIN 300 MG/1
300 CAPSULE ORAL
Qty: 30 | Refills: 0 | Status: ACTIVE | COMMUNITY
Start: 2024-01-19 | End: 1900-01-01

## 2024-01-19 NOTE — HISTORY OF PRESENT ILLNESS
[FreeTextEntry1] : Follow-up regarding right ulnar neuropathy.  See note from when she was seen in the office 29 days ago.  I ordered new EMGs.  She comes in to review the results discuss treatment recommendations.  I reviewed EMGs dated 1/12/2024.  These demonstrated: Right ulnar neuropathy at the segment distal to the branch of the dorsal ulnar cutaneous nerve with axonal loss and active denervation.  Left ulnar neuropathy across the elbow as seen in cubital tunnel syndrome.  Bilateral carpal tunnel syndrome with axonal loss.  No evidence of a cervical radiculopathy.  EMGs dated 2006 demonstrated: mild myopathic process with minimal denervation. Evidence for superimposed mild and chronic median neuropathies across both wrists (CTS) and a mild and chronic ulnar neuropathy across the right elbows. There is no definite evidence for a sensorimotor polyneuropathy or radiculopathy on this study.  Of note, she has a history of ovarian cancer, 20 years ago.

## 2024-01-19 NOTE — ADDENDUM
[FreeTextEntry1] :  I, Alex Cruz, acted solely as a scribe for Dr. Sidhu on this date on 01/19/2024.

## 2024-01-19 NOTE — DISCUSSION/SUMMARY
[FreeTextEntry1] : I reviewed the EMG results with her.  I had a discussion regarding today's visit, the diagnosis and treatment recommendations and options.  We also discussed changes since the last visit.  At this time, I recommended surgical management of open right carpal tunnel, ulnar nerve release at Guyon's canal and right cubital tunnel release.  I also recommended release of the ulnar nerve at the cubital tunnel, despite the EMGs, as she does have positive provocative signs for cubital tunnel syndrome.  I discussed surgical management in detail with the patient. She agreed to proceed with surgical management. She will speak to my surgical scheduler in regard to booking surgery.  In the meantime, because of her significant pain at night, she was prescribed a course of Gabapentin to take at night.  Finally, I recommended an MRI to evaluate right wrist and hand to better evaluate Guyon's canal preoperatively. She will follow up after her MRI to review the results and discuss treatment recommendations.  Finally, I did recommend she see a neurologist to rule out a peripheral neuropathy and possible multifocal compression neuropathy.  I told her that this can be done before or after her surgery.  -  The nature and purposes of open right carpal tunnel release, in addition to ulnar nerve release at Guyon's canal and cubital tunnel release.  Where discussed in detail with the patient.   We discussed the surgical procedures in detail, as well as expected postoperative recovery and outcome. -  Possible risks, benefits and complications (from known and unknown causes) of the procedure were discussed in detail. -  Possible non-operative alternatives to the proposed treatment were discussed in detail. -  She was told that possible risks/complications include, but are not limited to:  Infection, nerve or vessel injury, stiffness, painful scar, poor outcome, need for additional surgical procedures, and other unforeseen complications. -  In addition, the possibility of an "unsuccessful outcome," despite "successful surgery," was discussed with the patient.  The patient understands that nerve recovery after surgical release can be unpredictable, and there are no "guarantees" that the preoperative symptoms will completely resolve.  This is particularly true in her case, given her advanced ulnar neuropathy.  I did tell her that her ulnar nerve may or may not recover, and this could be quite unpredictable. -  The patient fully understands these risks and wishes to proceed. -  I had a lengthy discussion with the patient regarding today's visit, the diagnosis, and my surgical treatment recommendations.  The patient has agreed to this plan of management and has expressed full understanding.  All questions were fully answered to the patient's satisfaction.  My cumulative time spent on today's visit was greater than 45 minutes and included: Preparation for the visit, review of the medical records, review of pertinent diagnostic studies, examination and counseling of the patient on the above diagnosis, treatment plan and prognosis, orders of diagnostic tests, medications and/or appropriate procedures and documentation in the medical records of today's visit.

## 2024-01-19 NOTE — END OF VISIT
[FreeTextEntry3] : This note was written by Alex Cruz on 01/19/2024 acting solely as a scribe for Dr. Damon Sidhu.   All medical record entries made by the Scribe were at my, Dr. Damon Sidhu, direction and personally dictated by me on 01/19/2024. I have personally reviewed the chart and agree that the record accurately reflects my personal performance of the history, physical exam, assessment and plan.

## 2024-01-19 NOTE — PHYSICAL EXAM
[de-identified] : - Constitutional: This is a female in no obvious distress.   - Psych: Patient is alert and oriented to person, place and time.  Patient has a normal mood and affect. - Cardiovascular: Normal pulses throughout the upper extremities.  No significant varicosities are noted in the upper extremities.  - Neuro: Strength and sensation are intact throughout the upper extremities.  Patient has normal coordination. - Respiratory:  Patient exhibits no evidence of shortness of breath or difficulty breathing. - Skin: No rashes, lesions, or other abnormalities are noted in the upper extremities. ---  Examination of her right hand and upper extremity demonstrates obvious first dorsal interosseous wasting.  She has decreased sensation to light touch along the ulnar nerve distribution with normal sensation along the radial and median nerve distributions.  She has obvious weakness of the ulnar nerve innervated intrinsics within the hand.  Provocative signs for cubital tunnel syndrome are positive.  There is no instability of the ulnar nerve with flexion and extension of the elbow.  Provocative signs for carpal tunnel syndrome are positive negative.  Provocative signs for compression of the ulnar nerve at Guyon's canal are positive.

## 2024-01-24 ENCOUNTER — APPOINTMENT (OUTPATIENT)
Dept: ORTHOPEDIC SURGERY | Facility: CLINIC | Age: 69
End: 2024-01-24
Payer: MEDICARE

## 2024-01-31 ENCOUNTER — APPOINTMENT (OUTPATIENT)
Dept: ORTHOPEDIC SURGERY | Facility: CLINIC | Age: 69
End: 2024-01-31
Payer: MEDICARE

## 2024-01-31 VITALS — HEIGHT: 65 IN | WEIGHT: 175 LBS | BODY MASS INDEX: 29.16 KG/M2

## 2024-01-31 PROCEDURE — 99214 OFFICE O/P EST MOD 30 MIN: CPT

## 2024-01-31 RX ORDER — MELOXICAM 15 MG/1
15 TABLET ORAL DAILY
Qty: 20 | Refills: 1 | Status: ACTIVE | COMMUNITY
Start: 2024-01-31 | End: 1900-01-01

## 2024-01-31 NOTE — HISTORY OF PRESENT ILLNESS
[FreeTextEntry1] : SEND CTS SPECIMEN FOR AMYLOIDOSIS, AS [place] [facility] [place]  Follow-up regarding right carpal and cubital tunnel syndrome.  See note from when she was seen in the office 12 days ago.  I recommended endoscopic right carpal tunnel release and right ulnar nerve release at the cubital tunnel.  She returns today, and rates her pain as a 9/10.  She has a number of questions.  She is reports that she took Gabapentin for 10 days and states that it did not provide her symptoms with relief.  EMGs dated 1/12/2024 demonstrated: Right ulnar neuropathy at the segment distal to the branch of the dorsal ulnar cutaneous nerve with axonal loss and active denervation.  Left ulnar neuropathy across the elbow as seen in cubital tunnel syndrome.  Bilateral carpal tunnel syndrome with axonal loss.  No evidence of a cervical radiculopathy.  EMGs dated 2006 demonstrated: mild myopathic process with minimal denervation. Evidence for superimposed mild and chronic median neuropathies across both wrists (CTS) and a mild and chronic ulnar neuropathy across the right elbows. There is no definite evidence for a sensorimotor polyneuropathy or radiculopathy on this study.  Of note, she has a history of ovarian cancer, 20 years ago.

## 2024-01-31 NOTE — DISCUSSION/SUMMARY
[FreeTextEntry1] : I reviewed the EMG results with her.  I had a discussion regarding today's visit, the diagnosis and treatment recommendations and options.  We also discussed changes since the last visit.  At this time, I recommended surgical management of open right carpal tunnel, ulnar nerve release at Guyon's canal and right cubital tunnel release.  I also recommended release of the ulnar nerve at the cubital tunnel, despite the EMGs, as she does have positive provocative signs for cubital tunnel syndrome.  I discussed surgical management in detail with the patient.  We previously discussed associated risks and benefits of the procedures.  She agreed to proceed with surgical management. She will speak to my surgical scheduler in regard to booking surgery.  She did tell me that her oncologist at [facility] [place] would like me to send a specimen for amyloidosis.  I will send this as I am performing an open carpal tunnel release.  In the meantime, because of her significant pain at night, she was prescribed a course of Gabapentin to take at night.  Finally, I recommended an MRI to evaluate right wrist and hand to better evaluate Guyon's canal preoperatively. She will follow up after her MRI to review the results and discuss treatment recommendations.  Finally, I did recommend she see a neurologist to rule out a peripheral neuropathy and possible multifocal compression neuropathy.  I told her that this can be done before or after her surgery.  My cumulative time spent on today's visit was greater than 45 minutes and included: Preparation for the visit, review of the medical records, review of pertinent diagnostic studies, examination and counseling of the patient on the above diagnosis, treatment plan and prognosis, orders of diagnostic tests, medications and/or appropriate procedures and documentation in the medical records of today's visit.

## 2024-01-31 NOTE — END OF VISIT
[FreeTextEntry3] : This note was written by Alex Cruz on 01/31/2024 acting solely as a scribe for Dr. Damon Sidhu.   All medical record entries made by the Scribe were at my, Dr. Damon Sidhu, direction and personally dictated by me on 01/31/2024. I have personally reviewed the chart and agree that the record accurately reflects my personal performance of the history, physical exam, assessment and plan.

## 2024-01-31 NOTE — PHYSICAL EXAM
[de-identified] : - Constitutional: This is a female in no obvious distress.   - Psych: Patient is alert and oriented to person, place and time.  Patient has a normal mood and affect. - Cardiovascular: Normal pulses throughout the upper extremities.  No significant varicosities are noted in the upper extremities.  - Neuro: Strength and sensation are intact throughout the upper extremities.  Patient has normal coordination. - Respiratory:  Patient exhibits no evidence of shortness of breath or difficulty breathing. - Skin: No rashes, lesions, or other abnormalities are noted in the upper extremities. ---  Examination of her right hand and upper extremity demonstrates obvious first dorsal interosseous wasting.  She has decreased sensation to light touch along the ulnar nerve distribution with normal sensation along the radial and median nerve distributions.  She has obvious weakness of the ulnar nerve innervated intrinsics within the hand.  Provocative signs for cubital tunnel syndrome are positive.  There is no instability of the ulnar nerve with flexion and extension of the elbow.  Provocative signs for carpal tunnel syndrome are positive negative.  Provocative signs for compression of the ulnar nerve at Guyon's canal are positive.

## 2024-01-31 NOTE — ADDENDUM
[FreeTextEntry1] :  I, Alex Cruz, acted solely as a scribe for Dr. Sidhu on this date on 01/31/2024.

## 2024-02-05 ENCOUNTER — TRANSCRIPTION ENCOUNTER (OUTPATIENT)
Age: 69
End: 2024-02-05

## 2024-02-05 RX ORDER — GABAPENTIN 300 MG/1
300 CAPSULE ORAL
Qty: 30 | Refills: 6 | Status: ACTIVE | COMMUNITY
Start: 2024-02-05 | End: 1900-01-01

## 2024-02-05 RX ORDER — GABAPENTIN 100 MG/1
100 CAPSULE ORAL 3 TIMES DAILY
Qty: 60 | Refills: 0 | Status: ACTIVE | COMMUNITY
Start: 2024-02-05 | End: 1900-01-01

## 2024-02-09 ENCOUNTER — NON-APPOINTMENT (OUTPATIENT)
Age: 69
End: 2024-02-09

## 2024-02-09 ENCOUNTER — APPOINTMENT (OUTPATIENT)
Dept: MRI IMAGING | Facility: CLINIC | Age: 69
End: 2024-02-09
Payer: MEDICARE

## 2024-02-09 PROCEDURE — 73221 MRI JOINT UPR EXTREM W/O DYE: CPT | Mod: RT,MH

## 2024-02-09 PROCEDURE — 73218 MRI UPPER EXTREMITY W/O DYE: CPT | Mod: RT,MH

## 2024-02-13 ENCOUNTER — TRANSCRIPTION ENCOUNTER (OUTPATIENT)
Age: 69
End: 2024-02-13

## 2024-02-14 ENCOUNTER — OUTPATIENT (OUTPATIENT)
Dept: OUTPATIENT SERVICES | Facility: HOSPITAL | Age: 69
LOS: 1 days | End: 2024-02-14
Payer: MEDICARE

## 2024-02-14 VITALS
HEIGHT: 64 IN | TEMPERATURE: 98 F | OXYGEN SATURATION: 99 % | WEIGHT: 181 LBS | HEART RATE: 85 BPM | DIASTOLIC BLOOD PRESSURE: 80 MMHG | SYSTOLIC BLOOD PRESSURE: 130 MMHG | RESPIRATION RATE: 14 BRPM

## 2024-02-14 DIAGNOSIS — Z98.49 CATARACT EXTRACTION STATUS, UNSPECIFIED EYE: Chronic | ICD-10-CM

## 2024-02-14 DIAGNOSIS — G56.01 CARPAL TUNNEL SYNDROME, RIGHT UPPER LIMB: ICD-10-CM

## 2024-02-14 DIAGNOSIS — G56.21 LESION OF ULNAR NERVE, RIGHT UPPER LIMB: ICD-10-CM

## 2024-02-14 DIAGNOSIS — Z90.710 ACQUIRED ABSENCE OF BOTH CERVIX AND UTERUS: Chronic | ICD-10-CM

## 2024-02-14 DIAGNOSIS — Z96.643 PRESENCE OF ARTIFICIAL HIP JOINT, BILATERAL: Chronic | ICD-10-CM

## 2024-02-14 DIAGNOSIS — Z01.818 ENCOUNTER FOR OTHER PREPROCEDURAL EXAMINATION: ICD-10-CM

## 2024-02-14 PROCEDURE — 93005 ELECTROCARDIOGRAM TRACING: CPT

## 2024-02-14 PROCEDURE — 93010 ELECTROCARDIOGRAM REPORT: CPT

## 2024-02-14 PROCEDURE — G0463: CPT

## 2024-02-14 NOTE — H&P PST ADULT - PROBLEM SELECTOR PLAN 1
scheduled for right carpal tunnel release   will obtain medical clearance   pre op instructions scheduled for right carpal tunnel, right cubital tunnel release on 2/20/24  will obtain medical clearance   pre op instructions

## 2024-02-14 NOTE — H&P PST ADULT - NSICDXPASTSURGICALHX_GEN_ALL_CORE_FT
PAST SURGICAL HISTORY:  S/P cataract surgery     S/P hip replacement, bilateral     S/P total hysterectomy and bilateral salpingo-oophorectomy

## 2024-02-14 NOTE — H&P PST ADULT - NSICDXPASTMEDICALHX_GEN_ALL_CORE_FT
PAST MEDICAL HISTORY:  HTN (hypertension)     MGUS (monoclonal gammopathy of unknown significance)     Ovarian cancer     Right carpal tunnel syndrome

## 2024-02-14 NOTE — H&P PST ADULT - NEGATIVE RESPIRATORY AND THORAX SYMPTOMS
Addended by: David Cruz on: 1/30/2023 08:37 AM     Modules accepted: Orders no wheezing/no dyspnea/no cough

## 2024-02-14 NOTE — H&P PST ADULT - REASON FOR ADMISSION
Right carpal tunnel and right cubital tunnel surgery "Right carpal tunnel and right cubital tunnel surgery"

## 2024-02-14 NOTE — H&P PST ADULT - HISTORY OF PRESENT ILLNESS
This is a69 y/o female with right hand pain , numbness, tingling , weaknes for the several years . alsp complaint of roight e;lbpw /arm pain with radaition to finger .Reports worse symptoms at night and pain affects the sleep . scheduled for right carapl,tunnel  This is a 70 y/o female with right hand pain , numbness, tingling, decreased strength for the past  several years .Also complaint of right elbow /arm pain with radiation to finger .Reports worse symptoms at night and pain affects the sleep . scheduled for right carpal tunnel release , right cubital tunnel release on 2/20/24

## 2024-02-14 NOTE — H&P PST ADULT - NSICDXFAMILYHX_GEN_ALL_CORE_FT
FAMILY HISTORY:  Father  Still living? Unknown  FH: HTN (hypertension), Age at diagnosis: Age Unknown    Mother  Still living? Yes, Estimated age:   Family history of stroke, Age at diagnosis: Age Unknown  FH: HTN (hypertension), Age at diagnosis: Age Unknown    Sibling  Still living? Yes, Estimated age: Age Unknown  FH: HTN (hypertension), Age at diagnosis: Age Unknown

## 2024-02-14 NOTE — H&P PST ADULT - HEMATOLOGY/LYMPHATICS COMMENTS
h/o ovarian 1999 s/p chemo and total hysterectomt . MGUS Cx 2019 being monitored by [facility] h/o ovarian 1999 s/p chemo and total hysterectomy . MGUS Cx 2019 being monitored by [facility]

## 2024-02-14 NOTE — H&P PST ADULT - MUSCULOSKELETAL
decreased ROM due to pain/no chest wall tenderness details… right hand/decreased ROM due to pain/no chest wall tenderness/decreased strength

## 2024-02-19 ENCOUNTER — TRANSCRIPTION ENCOUNTER (OUTPATIENT)
Age: 69
End: 2024-02-19

## 2024-02-20 ENCOUNTER — RESULT REVIEW (OUTPATIENT)
Age: 69
End: 2024-02-20

## 2024-02-20 ENCOUNTER — APPOINTMENT (OUTPATIENT)
Dept: ORTHOPEDIC SURGERY | Facility: HOSPITAL | Age: 69
End: 2024-02-20

## 2024-02-20 ENCOUNTER — OUTPATIENT (OUTPATIENT)
Dept: OUTPATIENT SERVICES | Facility: HOSPITAL | Age: 69
LOS: 1 days | End: 2024-02-20
Payer: MEDICARE

## 2024-02-20 ENCOUNTER — TRANSCRIPTION ENCOUNTER (OUTPATIENT)
Age: 69
End: 2024-02-20

## 2024-02-20 VITALS
HEIGHT: 64 IN | OXYGEN SATURATION: 100 % | HEART RATE: 87 BPM | DIASTOLIC BLOOD PRESSURE: 82 MMHG | SYSTOLIC BLOOD PRESSURE: 144 MMHG | WEIGHT: 177.25 LBS | RESPIRATION RATE: 18 BRPM | TEMPERATURE: 97 F

## 2024-02-20 VITALS
OXYGEN SATURATION: 99 % | SYSTOLIC BLOOD PRESSURE: 119 MMHG | DIASTOLIC BLOOD PRESSURE: 71 MMHG | RESPIRATION RATE: 20 BRPM

## 2024-02-20 DIAGNOSIS — Z98.49 CATARACT EXTRACTION STATUS, UNSPECIFIED EYE: Chronic | ICD-10-CM

## 2024-02-20 DIAGNOSIS — Z96.643 PRESENCE OF ARTIFICIAL HIP JOINT, BILATERAL: Chronic | ICD-10-CM

## 2024-02-20 DIAGNOSIS — G56.21 LESION OF ULNAR NERVE, RIGHT UPPER LIMB: ICD-10-CM

## 2024-02-20 DIAGNOSIS — G56.01 CARPAL TUNNEL SYNDROME, RIGHT UPPER LIMB: ICD-10-CM

## 2024-02-20 DIAGNOSIS — Z01.818 ENCOUNTER FOR OTHER PREPROCEDURAL EXAMINATION: ICD-10-CM

## 2024-02-20 DIAGNOSIS — Z90.710 ACQUIRED ABSENCE OF BOTH CERVIX AND UTERUS: Chronic | ICD-10-CM

## 2024-02-20 PROCEDURE — 88313 SPECIAL STAINS GROUP 2: CPT

## 2024-02-20 PROCEDURE — 64721 CARPAL TUNNEL SURGERY: CPT | Mod: RT,59

## 2024-02-20 PROCEDURE — 64718 REVISE ULNAR NERVE AT ELBOW: CPT | Mod: RT

## 2024-02-20 PROCEDURE — 88304 TISSUE EXAM BY PATHOLOGIST: CPT | Mod: 26

## 2024-02-20 PROCEDURE — 88304 TISSUE EXAM BY PATHOLOGIST: CPT

## 2024-02-20 PROCEDURE — 64721 CARPAL TUNNEL SURGERY: CPT | Mod: RT

## 2024-02-20 PROCEDURE — 64718 REVISE ULNAR NERVE AT ELBOW: CPT | Mod: AS,RT

## 2024-02-20 PROCEDURE — 88313 SPECIAL STAINS GROUP 2: CPT | Mod: 26

## 2024-02-20 PROCEDURE — 64719 REVISE ULNAR NERVE AT WRIST: CPT | Mod: RT,59

## 2024-02-20 RX ORDER — OXYCODONE HYDROCHLORIDE 5 MG/1
5 TABLET ORAL ONCE
Refills: 0 | Status: DISCONTINUED | OUTPATIENT
Start: 2024-02-20 | End: 2024-02-21

## 2024-02-20 RX ORDER — HYDROMORPHONE HYDROCHLORIDE 2 MG/ML
0.5 INJECTION INTRAMUSCULAR; INTRAVENOUS; SUBCUTANEOUS
Refills: 0 | Status: DISCONTINUED | OUTPATIENT
Start: 2024-02-20 | End: 2024-02-21

## 2024-02-20 RX ORDER — LISINOPRIL 2.5 MG/1
1 TABLET ORAL
Refills: 0 | DISCHARGE

## 2024-02-20 RX ORDER — CHLORHEXIDINE GLUCONATE 213 G/1000ML
1 SOLUTION TOPICAL ONCE
Refills: 0 | Status: COMPLETED | OUTPATIENT
Start: 2024-02-20 | End: 2024-02-20

## 2024-02-20 RX ORDER — HYDROMORPHONE HYDROCHLORIDE 2 MG/ML
0.2 INJECTION INTRAMUSCULAR; INTRAVENOUS; SUBCUTANEOUS
Refills: 0 | Status: DISCONTINUED | OUTPATIENT
Start: 2024-02-20 | End: 2024-02-21

## 2024-02-20 RX ORDER — APREPITANT 80 MG/1
40 CAPSULE ORAL ONCE
Refills: 0 | Status: COMPLETED | OUTPATIENT
Start: 2024-02-20 | End: 2024-02-20

## 2024-02-20 RX ORDER — SODIUM CHLORIDE 9 MG/ML
1000 INJECTION, SOLUTION INTRAVENOUS
Refills: 0 | Status: DISCONTINUED | OUTPATIENT
Start: 2024-02-20 | End: 2024-02-21

## 2024-02-20 RX ORDER — IBUPROFEN 200 MG
1 TABLET ORAL
Qty: 10 | Refills: 0
Start: 2024-02-20

## 2024-02-20 RX ORDER — NALOXONE HYDROCHLORIDE 4 MG/.1ML
4 SPRAY NASAL
Qty: 1 | Refills: 0
Start: 2024-02-20

## 2024-02-20 RX ORDER — CEFAZOLIN SODIUM 1 G
2000 VIAL (EA) INJECTION ONCE
Refills: 0 | Status: COMPLETED | OUTPATIENT
Start: 2024-02-20 | End: 2024-02-20

## 2024-02-20 RX ORDER — ACETAMINOPHEN WITH CODEINE 300MG-30MG
1 TABLET ORAL
Qty: 10 | Refills: 0
Start: 2024-02-20

## 2024-02-20 RX ORDER — ONDANSETRON 8 MG/1
4 TABLET, FILM COATED ORAL ONCE
Refills: 0 | Status: DISCONTINUED | OUTPATIENT
Start: 2024-02-20 | End: 2024-02-21

## 2024-02-20 RX ORDER — GABAPENTIN 400 MG/1
1 CAPSULE ORAL
Refills: 0 | DISCHARGE

## 2024-02-20 RX ADMIN — APREPITANT 40 MILLIGRAM(S): 80 CAPSULE ORAL at 11:53

## 2024-02-20 RX ADMIN — CHLORHEXIDINE GLUCONATE 1 APPLICATION(S): 213 SOLUTION TOPICAL at 11:53

## 2024-02-20 NOTE — ASU DISCHARGE PLAN (ADULT/PEDIATRIC) - CARE PROVIDER_API CALL
Damon Sidhu)  Surgery of the Hand  [address], [address]  [place] [address]  Phone: [phone]  Fax: [phone]  Established Patient  Scheduled Appointment: 02/28/2024

## 2024-02-20 NOTE — BRIEF OPERATIVE NOTE - NSICDXBRIEFPOSTOP_GEN_ALL_CORE_FT
POST-OP DIAGNOSIS:  Cubital tunnel syndrome on right 20-Feb-2024 14:16:19  Yung Jackson  Right carpal tunnel syndrome 20-Feb-2024 14:16:16  Yung Jackson

## 2024-02-20 NOTE — ASU DISCHARGE PLAN (ADULT/PEDIATRIC) - ASU DC SPECIAL INSTRUCTIONSFT
Follow-up with Dr. Sidhu on Wednesday 2/28/2024  - Do not remove dressing prior to follow up appointment.  - Keep dressing clean and dry, may use cast bag/plastic bag to shower  - Elevate extremity  - Ice 20 minutes on, 20 minutes off

## 2024-02-20 NOTE — BRIEF OPERATIVE NOTE - NSICDXBRIEFPROCEDURE_GEN_ALL_CORE_FT
PROCEDURES:  Right cubital tunnel release 20-Feb-2024 14:15:25  Yung Jackson  Open release of right carpal tunnel 20-Feb-2024 14:15:35  Yung Jackson

## 2024-02-20 NOTE — BRIEF OPERATIVE NOTE - NSICDXBRIEFPREOP_GEN_ALL_CORE_FT
PRE-OP DIAGNOSIS:  Right carpal tunnel syndrome 20-Feb-2024 14:16:11  Yung Jackson  Cubital tunnel syndrome on right 20-Feb-2024 14:16:04  Yung Jackson

## 2024-02-21 PROBLEM — I10 ESSENTIAL (PRIMARY) HYPERTENSION: Chronic | Status: ACTIVE | Noted: 2024-02-14

## 2024-02-21 PROBLEM — D47.2 MONOCLONAL GAMMOPATHY: Chronic | Status: ACTIVE | Noted: 2024-02-14

## 2024-02-28 ENCOUNTER — APPOINTMENT (OUTPATIENT)
Dept: ORTHOPEDIC SURGERY | Facility: CLINIC | Age: 69
End: 2024-02-28
Payer: MEDICARE

## 2024-02-28 PROBLEM — G56.01 CARPAL TUNNEL SYNDROME, RIGHT UPPER LIMB: Chronic | Status: ACTIVE | Noted: 2024-02-14

## 2024-02-28 PROBLEM — C56.9 MALIGNANT NEOPLASM OF UNSPECIFIED OVARY: Chronic | Status: ACTIVE | Noted: 2024-02-14

## 2024-02-28 PROCEDURE — 99024 POSTOP FOLLOW-UP VISIT: CPT

## 2024-02-28 RX ORDER — MELOXICAM 15 MG/1
15 TABLET ORAL DAILY
Qty: 20 | Refills: 1 | Status: ACTIVE | COMMUNITY
Start: 2024-02-28 | End: 1900-01-01

## 2024-02-28 NOTE — HISTORY OF PRESENT ILLNESS
[de-identified] : 8 days postoperative. [de-identified] : 8 days status post right open carpal tunnel release, Guyon's canal release and right in situ ulnar nerve release at the cubital tunnel.  Date of surgery: 2/20/2024.  She is having tightness and swelling in her hand. She also has numbness and some pain at times in her fingers.    She is accompanied by her sister.  [de-identified] : Examination of her right wrist hand and elbow after the dressings were removed demonstrates her incisions to be clean and dry.  There is no evidence of an infection.  There is diffuse swelling of the hand and it appears as if the dressing was tight.  She has some limitation of terminal flexion and extension of the digits.  Her neurologic examination is similar to preoperative. [de-identified] : Stable, 8 days postoperative. [de-identified] : The suture ends were cut and Steri-Strips were applied.  She was instructed on local wound care, when to begin scar massage and desensitization, range of motion exercises and will followup in 3 weeks. She was also provided with the appropriate referral to hand therapy, which she can start in a week or 2.. I recommended she begin a course of Mobic 15 mg once daily with meals. I warned about potential GI side effects. The patient was instructed to return before then or to call the office if there are any problems in the interim.  I did tell her that if the swelling is not decreasing or she is having any problems or concerns before 3 weeks, and she should call the office.

## 2024-02-28 NOTE — END OF VISIT
[FreeTextEntry3] : This note was written by Alex Cruz on 02/28/2024 acting solely as a scribe for Dr. Damon Sidhu.   All medical record entries made by the Scribe were at my, Dr. Damon Sidhu, direction and personally dictated by me on 02/28/2024. I have personally reviewed the chart and agree that the record accurately reflects my personal performance of the history, physical exam, assessment and plan.

## 2024-03-04 RX ORDER — ACETAMINOPHEN AND CODEINE PHOSPHATE 300; 30 MG/1; MG/1
300-30 TABLET ORAL 3 TIMES DAILY
Qty: 10 | Refills: 0 | Status: ACTIVE | COMMUNITY
Start: 2024-02-23 | End: 1900-01-01

## 2024-03-06 ENCOUNTER — TRANSCRIPTION ENCOUNTER (OUTPATIENT)
Age: 69
End: 2024-03-06

## 2024-03-08 ENCOUNTER — TRANSCRIPTION ENCOUNTER (OUTPATIENT)
Age: 69
End: 2024-03-08

## 2024-03-08 RX ORDER — IBUPROFEN 600 MG/1
600 TABLET, FILM COATED ORAL
Qty: 28 | Refills: 0 | Status: ACTIVE | COMMUNITY
Start: 2024-03-08 | End: 1900-01-01

## 2024-03-12 ENCOUNTER — APPOINTMENT (OUTPATIENT)
Dept: ORTHOPEDIC SURGERY | Facility: CLINIC | Age: 69
End: 2024-03-12
Payer: MEDICARE

## 2024-03-12 PROCEDURE — 99024 POSTOP FOLLOW-UP VISIT: CPT

## 2024-03-12 RX ORDER — METHYLPREDNISOLONE 4 MG/1
4 TABLET ORAL
Qty: 21 | Refills: 0 | Status: ACTIVE | COMMUNITY
Start: 2024-03-12 | End: 1900-01-01

## 2024-03-12 RX ORDER — GABAPENTIN 300 MG/1
300 CAPSULE ORAL 3 TIMES DAILY
Qty: 60 | Refills: 2 | Status: ACTIVE | COMMUNITY
Start: 2024-03-12 | End: 1900-01-01

## 2024-03-12 NOTE — END OF VISIT
[FreeTextEntry3] : This note was written by Elan Archer on 03/12/2024 acting solely as a scribe for Dr. Damon Sidhu.   All medical record entries made by the Scribe were at my, Dr. Damon Sidhu, direction and personally dictated by me on 03/12/2024. I have personally reviewed the chart and agree that the record accurately reflects my personal performance of the history, physical exam, assessment and plan.

## 2024-03-12 NOTE — ADDENDUM
[FreeTextEntry1] : I, Elan Archer, acted solely as a scribe for Dr. Sidhu on this date on 03/12/2024.

## 2024-03-12 NOTE — HISTORY OF PRESENT ILLNESS
[de-identified] : 3 weeks postoperative. [de-identified] : 3 weeks status post right open carpal tunnel release, Guyon's canal release and right in situ ulnar nerve release at the cubital tunnel.  Date of surgery: 2/20/2024.  She is not doing well. She is having severe pain. She describes the pain as throbbing, with occasional burning pain in her hand and shooting pain up her arm. She's been taking ibuprofen 600mg which has not helped. She recently began physical therapy, but she is unable to do her exercises due to her pain. [de-identified] : Examination of her right wrist, hand and elbow demonstrates her incisions healing well.  There are no signs of infection.  There is residual although decreased swelling.  She has limitation of terminal flexion and extension of the digits.  Her neurologic examination is similar to preoperative. [de-identified] : 3 weeks postoperative, with residual pain and swelling, secondary to inflammation and hypersensitivity along the ulnar nerve distribution. [de-identified] : At this time, she was instructed on continued range of motion exercises and scar massage and desensitization. I provided a prescription for gabapentin 300mg to be taken 3 times a day.  We discussed potential side effects including fatigue during the day. I also provided a prescription for a Medrol dosepak that she will begin if she does not experience relief from the gabapentin by the end of the week. She will follow up in 2 weeks.  If she is noticing increasing pain swelling or other concerns, then she was instructed to immediately call the office.

## 2024-03-21 ENCOUNTER — TRANSCRIPTION ENCOUNTER (OUTPATIENT)
Age: 69
End: 2024-03-21

## 2024-03-22 ENCOUNTER — APPOINTMENT (OUTPATIENT)
Dept: ORTHOPEDIC SURGERY | Facility: CLINIC | Age: 69
End: 2024-03-22
Payer: MEDICARE

## 2024-03-22 PROCEDURE — 99024 POSTOP FOLLOW-UP VISIT: CPT

## 2024-03-22 RX ORDER — CELECOXIB 200 MG/1
200 CAPSULE ORAL TWICE DAILY
Qty: 30 | Refills: 2 | Status: ACTIVE | COMMUNITY
Start: 2024-03-22 | End: 1900-01-01

## 2024-03-22 NOTE — HISTORY OF PRESENT ILLNESS
[de-identified] : 31 days postoperative. [de-identified] : 31 days status post right open carpal tunnel release, Guyon's canal release and right in situ ulnar nerve release at the cubital tunnel.  Date of surgery: 2/20/2024.  See note from when she was seen in the office 10 days ago.  She was prescribed gabapentin 300 mg 3 times daily as well as a Medrol Dosepak, as she was having persistent symptoms secondary to ulnar neuropathy.  She is still having pain and swelling. She states that her swelling decreased after completing the Medrol Dosepak. She states that her swelling has returned now.  She is taking Gabapentin 600 mg, three times daily. She rates her pain as an 8/10 and has numbness/tingling.  [de-identified] : Examination of her right wrist, hand and elbow demonstrates her incisions healing well.  There are no signs of infection.  There is residual although decreased swelling.  She has limitation improved flexion and extension of the digits.  Her neurologic examination is similar to preoperative. [de-identified] : 31 days postoperative, with persistent symptoms.  There was minimal improvement with the Medrol Dosepak. [de-identified] : At this time, as her swelling has decrease but pain is not improving, I recommended for her to see a Pain management specialist Dr. Ambrosio Huang.  I will reach out to him to see if she can be seen expeditiously.  I also recommended for her to continue taking Gabapentin 600 mg TID.  I recommended she begin a course of Celebrex 200 mg 1-2 times a day with meals. I warned about potential GI side effects.  She will follow-up with me in 2 weeks.

## 2024-03-22 NOTE — END OF VISIT
[FreeTextEntry3] : This note was written by Alex Cruz on 03/22/2024 acting solely as a scribe for Dr. Damon Sidhu.   All medical record entries made by the Scribe were at my, Dr. Damon Sidhu, direction and personally dictated by me on 03/22/2024. I have personally reviewed the chart and agree that the record accurately reflects my personal performance of the history, physical exam, assessment and plan.

## 2024-03-22 NOTE — ADDENDUM
[FreeTextEntry1] :  I, Alex Cruz, acted solely as a scribe for Dr. Sidhu on this date on 03/22/2024.

## 2024-04-08 PROBLEM — G56.02 CARPAL TUNNEL SYNDROME OF LEFT WRIST: Status: ACTIVE | Noted: 2024-01-17

## 2024-04-10 ENCOUNTER — APPOINTMENT (OUTPATIENT)
Dept: ORTHOPEDIC SURGERY | Facility: CLINIC | Age: 69
End: 2024-04-10
Payer: MEDICARE

## 2024-04-10 DIAGNOSIS — G56.02 CARPAL TUNNEL SYNDROME, LEFT UPPER LIMB: ICD-10-CM

## 2024-04-10 PROCEDURE — 99024 POSTOP FOLLOW-UP VISIT: CPT

## 2024-04-10 RX ORDER — METHYLPREDNISOLONE 4 MG/1
4 TABLET ORAL
Qty: 21 | Refills: 0 | Status: ACTIVE | COMMUNITY
Start: 2024-04-10 | End: 1900-01-01

## 2024-04-18 ENCOUNTER — NON-APPOINTMENT (OUTPATIENT)
Age: 69
End: 2024-04-18

## 2024-04-18 NOTE — ADDENDUM
[FreeTextEntry1] :  I, Alex Cruz, acted solely as a scribe for Dr. Sidhu on this date on 04/10/2024.

## 2024-04-18 NOTE — HISTORY OF PRESENT ILLNESS
[de-identified] : 69 days postoperative. [de-identified] : 69 days status post right open carpal tunnel release, Guyon's canal release and right in situ ulnar nerve release at the cubital tunnel.  Date of surgery: 2/20/2024.  See prior notes.  Patient is [de-identified] : Examination of her right wrist, hand and elbow demonstrates her incisions healing well.  There are no signs of infection.  There is residual although decreased swelling.  She has persistent limitation of terminal flexion and extension of the digits, which is slightly improved.  Her neurologic examination is similar to preoperative. [de-identified] : 69 days postoperative, [de-identified] : At this time, I recommended

## 2024-04-18 NOTE — HISTORY OF PRESENT ILLNESS
[de-identified] : 50 days postoperative. [de-identified] : 50 days status post right open carpal tunnel release, Guyon's canal release and right in situ ulnar nerve release at the cubital tunnel.  Date of surgery: 2/20/2024.  See note from when she was in the office 19 days ago.  I referred her to Dr. Ambrosio Huang, a pain management specialist.  She was given a cortisone injection at that time.   She is overall improving. She is still having pain, swelling, numbness and difficulty picking up objects. She rates her pain as a 4/10 and states that her symptoms are worse at night. She is in hand therapy twice weekly. She also states that she is going for a biopsy for a neurogenic tumor and was sent for an MRI, which she had.  [de-identified] : Examination of her right wrist, hand and elbow demonstrates her incisions healing well.  There are no signs of infection.  There is residual although decreased swelling.  She has persistent limitation of terminal flexion and extension of the digits, which is slightly improved.  Her neurologic examination is similar to preoperative. [de-identified] : 50 days postoperative,, with minimal improvement. [de-identified] : At this time, I recommended she begin a second course of Medrol Dosepak with meals. I warned about potential GI side effects. She was also provided with an updated referral to hand therapy. She will follow up in 3 weeks.  She will follow-up with the pain management specialist, Dr. Huang, next week.  She told Dr. Huang last week and told me today that she has developed an enlarging mass in her right supraclavicular region.  She had a CAT scan and there was a question of whether or not this may represent a neurogenic tumor.  She had this evaluated at [facility] and she is going to schedule a biopsy.  We had never previously discussed this, and she did not mention this to me.  She told me today that this has been enlarging over the past several weeks.  I did tell her that it is possible that this may also be contributing to her persistent symptoms, particularly if this is either compressing her brachial plexus or possibly is neurogenic tumor such as a schwannoma or other nerve tumor, which could be resulting in neurologic symptoms.  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  However, it is possible that she may have an element of a double crush syndrome.  I told her that she needs to speak to me as soon as she gets the biopsy.  She was initially scheduled for an MRI which was canceled but it was decided by her physician at [facility] to go ahead with the biopsy without the MRI.

## 2024-04-18 NOTE — END OF VISIT
[FreeTextEntry3] : This note was written by Alex Cruz on 04/10/2024 acting solely as a scribe for Dr. Damon Sidhu.   All medical record entries made by the Scribe were at my, Dr. Damon Sidhu, direction and personally dictated by me on 04/10/2024. I have personally reviewed the chart and agree that the record accurately reflects my personal performance of the history, physical exam, assessment and plan.

## 2024-04-24 ENCOUNTER — TRANSCRIPTION ENCOUNTER (OUTPATIENT)
Age: 69
End: 2024-04-24

## 2024-04-24 PROBLEM — G56.01 CARPAL TUNNEL SYNDROME OF RIGHT WRIST: Status: ACTIVE | Noted: 2024-01-17

## 2024-04-24 PROBLEM — G56.21 ULNAR TUNNEL SYNDROME OF RIGHT WRIST: Status: ACTIVE | Noted: 2024-01-19

## 2024-04-24 PROBLEM — G56.21 CUBITAL TUNNEL SYNDROME ON RIGHT: Status: ACTIVE | Noted: 2023-12-21

## 2024-04-24 NOTE — HISTORY OF PRESENT ILLNESS
[de-identified] : 71 days postoperative. [de-identified] : 71 days status post right open carpal tunnel release, Guyon's canal release and right in situ ulnar nerve release at the cubital tunnel.  Date of surgery: 2/20/2024.  See prior notes.  She is [de-identified] : 71 days postoperative, [de-identified] : Examination of her right wrist, hand and elbow demonstrates her incisions healing well.  There are no signs of infection.  There is residual although decreased swelling.  She has persistent limitation of terminal flexion and extension of the digits, which is slightly improved.  Her neurologic examination is similar to preoperative. [de-identified] : At this time, I recommended

## 2024-05-01 ENCOUNTER — APPOINTMENT (OUTPATIENT)
Dept: ORTHOPEDIC SURGERY | Facility: CLINIC | Age: 69
End: 2024-05-01

## 2024-05-01 DIAGNOSIS — G56.21 LESION OF ULNAR NERVE, RIGHT UPPER LIMB: ICD-10-CM

## 2024-05-01 DIAGNOSIS — G56.01 CARPAL TUNNEL SYNDROME, RIGHT UPPER LIMB: ICD-10-CM

## 2024-05-02 ENCOUNTER — TRANSCRIPTION ENCOUNTER (OUTPATIENT)
Age: 69
End: 2024-05-02

## 2024-05-06 ENCOUNTER — TRANSCRIPTION ENCOUNTER (OUTPATIENT)
Age: 69
End: 2024-05-06

## 2024-08-12 ENCOUNTER — RESULT REVIEW (OUTPATIENT)
Age: 69
End: 2024-08-12

## 2024-08-12 ENCOUNTER — APPOINTMENT (OUTPATIENT)
Dept: CV DIAGNOSITCS | Facility: HOSPITAL | Age: 69
End: 2024-08-12

## 2024-09-17 ENCOUNTER — NON-APPOINTMENT (OUTPATIENT)
Age: 69
End: 2024-09-17

## 2024-09-23 ENCOUNTER — APPOINTMENT (OUTPATIENT)
Dept: GASTROENTEROLOGY | Facility: CLINIC | Age: 69
End: 2024-09-23
Payer: MEDICARE

## 2024-09-23 VITALS
DIASTOLIC BLOOD PRESSURE: 81 MMHG | WEIGHT: 164 LBS | BODY MASS INDEX: 27.32 KG/M2 | SYSTOLIC BLOOD PRESSURE: 119 MMHG | HEART RATE: 98 BPM | HEIGHT: 65 IN

## 2024-09-23 DIAGNOSIS — D48.119 DESMOID TUMOR OF UNSPECIFIED SITE: ICD-10-CM

## 2024-09-23 DIAGNOSIS — K59.00 CONSTIPATION, UNSPECIFIED: ICD-10-CM

## 2024-09-23 DIAGNOSIS — K64.9 UNSPECIFIED HEMORRHOIDS: ICD-10-CM

## 2024-09-23 DIAGNOSIS — K57.30 DIVERTICULOSIS OF LARGE INTESTINE W/OUT PERFORATION OR ABSCESS W/OUT BLEEDING: ICD-10-CM

## 2024-09-23 PROCEDURE — 99204 OFFICE O/P NEW MOD 45 MIN: CPT

## 2024-09-23 RX ORDER — HYDROCORTISONE 25 MG/G
2.5 CREAM TOPICAL
Qty: 1 | Refills: 1 | Status: ACTIVE | COMMUNITY
Start: 2024-09-23 | End: 1900-01-01

## 2024-09-23 RX ORDER — HYDROCORTISONE ACETATE, PRAMOXINE HCL 2.5; 1 G/100G; G/100G
2.5-1 CREAM TOPICAL
Qty: 1 | Refills: 1 | Status: ACTIVE | COMMUNITY
Start: 2024-09-23 | End: 1900-01-01

## 2024-09-23 NOTE — ASSESSMENT
[FreeTextEntry1] : 1.  Constipation, opiate induced. 2.  Bleeding external hemorrhoid. 3.  Desmoid tumor of right shoulder, on opiates. 4.  Diverticulosis, hemorrhoids on colonoscopy in 7/2020. 5.  History of GERD.  EGD in 2015 with mild reflux esophagitis, gastritis. 6.  HTN. 7.  History of ovarian cancer (poorly differentiated clear-cell-1999), s/p MALINA and chemotherapy. 8.  MGUS (IgA spike). 9.  Allergic to ampicillin, Bactrim.  Recs: - Prior records reviewed. - Patient advised to use Senokot, MiraLAX, or milk of magnesia to maintain 3-4 bowel movements per week.  If OTC laxatives are unsuccessful for opiate induced constipation, prescription laxatives will be attempted. - Patient was counseled on therapy for hemorrhoids, including avoiding sitting for prolonged periods of time, avoiding straining, laxatives to minimize constipation, Sitz baths 1-2 times a day, witch hazel pads and hydrocortisone/ phenylephrine creams when symptomatic.  She was advised on possible surgical intervention for painful thrombosed hemorrhoids.

## 2024-09-23 NOTE — PHYSICAL EXAM
[Alert] : alert [Sclera] : the sclera and conjunctiva were normal [Hearing Threshold Finger Rub Not [place]] : hearing was normal [Normal Appearance] : the appearance of the neck was normal [No Respiratory Distress] : no respiratory distress [Auscultation Breath Sounds / Voice Sounds] : lungs were clear to auscultation bilaterally [Heart Rate And Rhythm] : heart rate was normal and rhythm regular [Normal S1, S2] : normal S1 and S2 [None] : no edema [Bowel Sounds] : normal bowel sounds [No Masses] : no abdominal mass palpated [Abdomen Tenderness] : non-tender [Abdomen Soft] : soft [Normal Turgor] : normal skin turgor [Oriented To Time, Place, And Person] : oriented to person, place, and time [de-identified] : Swollen, inflamed external hemorrhoid with stigmata of bleeding [de-identified] : Mass appreciated in right shoulder [de-identified] : Right arm weakness

## 2024-10-09 ENCOUNTER — NON-APPOINTMENT (OUTPATIENT)
Age: 69
End: 2024-10-09

## 2024-10-09 ENCOUNTER — APPOINTMENT (OUTPATIENT)
Dept: OPHTHALMOLOGY | Facility: CLINIC | Age: 69
End: 2024-10-09
Payer: MEDICARE

## 2024-10-09 PROCEDURE — 92014 COMPRE OPH EXAM EST PT 1/>: CPT

## 2025-01-27 ENCOUNTER — NON-APPOINTMENT (OUTPATIENT)
Age: 70
End: 2025-01-27

## 2025-01-27 ENCOUNTER — APPOINTMENT (OUTPATIENT)
Dept: OPHTHALMOLOGY | Facility: CLINIC | Age: 70
End: 2025-01-27
Payer: MEDICARE

## 2025-01-27 PROCEDURE — 99214 OFFICE O/P EST MOD 30 MIN: CPT

## 2025-01-27 PROCEDURE — 92136 OPHTHALMIC BIOMETRY: CPT

## 2025-02-11 ENCOUNTER — APPOINTMENT (OUTPATIENT)
Dept: OPHTHALMOLOGY | Facility: HOSPITAL | Age: 70
End: 2025-02-11
Payer: MEDICARE

## 2025-02-11 ENCOUNTER — NON-APPOINTMENT (OUTPATIENT)
Age: 70
End: 2025-02-11

## 2025-02-11 PROCEDURE — 66984 XCAPSL CTRC RMVL W/O ECP: CPT | Mod: RT

## 2025-02-12 ENCOUNTER — APPOINTMENT (OUTPATIENT)
Dept: OPHTHALMOLOGY | Facility: CLINIC | Age: 70
End: 2025-02-12
Payer: MEDICARE

## 2025-02-12 ENCOUNTER — NON-APPOINTMENT (OUTPATIENT)
Age: 70
End: 2025-02-12

## 2025-02-12 PROCEDURE — 99024 POSTOP FOLLOW-UP VISIT: CPT

## 2025-02-19 ENCOUNTER — APPOINTMENT (OUTPATIENT)
Dept: OPHTHALMOLOGY | Facility: CLINIC | Age: 70
End: 2025-02-19
Payer: MEDICARE

## 2025-02-19 ENCOUNTER — NON-APPOINTMENT (OUTPATIENT)
Age: 70
End: 2025-02-19

## 2025-02-19 PROCEDURE — 99024 POSTOP FOLLOW-UP VISIT: CPT

## 2025-03-13 ENCOUNTER — APPOINTMENT (OUTPATIENT)
Dept: OPHTHALMOLOGY | Facility: CLINIC | Age: 70
End: 2025-03-13
Payer: MEDICARE

## 2025-03-13 ENCOUNTER — APPOINTMENT (OUTPATIENT)
Dept: OPHTHALMOLOGY | Facility: CLINIC | Age: 70
End: 2025-03-13
Payer: SELF-PAY

## 2025-03-13 ENCOUNTER — NON-APPOINTMENT (OUTPATIENT)
Age: 70
End: 2025-03-13

## 2025-03-13 PROCEDURE — 92015 DETERMINE REFRACTIVE STATE: CPT

## 2025-03-13 PROCEDURE — 99024 POSTOP FOLLOW-UP VISIT: CPT

## 2025-04-29 ENCOUNTER — APPOINTMENT (OUTPATIENT)
Dept: CARDIOLOGY | Facility: CLINIC | Age: 70
End: 2025-04-29

## 2025-06-19 ENCOUNTER — APPOINTMENT (OUTPATIENT)
Dept: OPHTHALMOLOGY | Facility: CLINIC | Age: 70
End: 2025-06-19
Payer: MEDICARE

## 2025-06-19 ENCOUNTER — NON-APPOINTMENT (OUTPATIENT)
Age: 70
End: 2025-06-19

## 2025-06-19 PROCEDURE — 92014 COMPRE OPH EXAM EST PT 1/>: CPT

## 2025-07-17 ENCOUNTER — APPOINTMENT (OUTPATIENT)
Dept: OPHTHALMOLOGY | Facility: CLINIC | Age: 70
End: 2025-07-17

## 2025-08-19 ENCOUNTER — NON-APPOINTMENT (OUTPATIENT)
Age: 70
End: 2025-08-19

## 2025-08-21 ENCOUNTER — APPOINTMENT (OUTPATIENT)
Dept: OPHTHALMOLOGY | Facility: CLINIC | Age: 70
End: 2025-08-21
Payer: MEDICARE

## 2025-08-21 ENCOUNTER — NON-APPOINTMENT (OUTPATIENT)
Age: 70
End: 2025-08-21

## 2025-08-21 PROCEDURE — 92133 CPTRZD OPH DX IMG PST SGM ON: CPT

## 2025-08-21 PROCEDURE — 92083 EXTENDED VISUAL FIELD XM: CPT

## (undated) DEVICE — DRSG KLING 4"

## (undated) DEVICE — DRAPE 3/4 SHEET 52X76"

## (undated) DEVICE — SUT MONOCRYL 5-0 18" P-3 UNDYED

## (undated) DEVICE — POSITIONER STRAP ARMBOARD VELCRO TS-30

## (undated) DEVICE — SPECIMEN CONTAINER PET

## (undated) DEVICE — VENODYNE/SCD SLEEVE CALF MEDIUM

## (undated) DEVICE — NDL HYPO REGULAR BEVEL 25G X 1.5" (BLUE)

## (undated) DEVICE — SOL IRR POUR NS 0.9% 500ML

## (undated) DEVICE — GLV 7 PROTEXIS (WHITE)

## (undated) DEVICE — WARMING BLANKET FULL ADULT

## (undated) DEVICE — DRAPE TOWEL BLUE 17" X 24"

## (undated) DEVICE — DRSG XEROFORM 1 X 8"

## (undated) DEVICE — TOURNIQUET ESMARK 4"

## (undated) DEVICE — PACK UPPER EXTREMITY

## (undated) DEVICE — SUT MONOSOF 5-0 18" P-12

## (undated) DEVICE — TOURNIQUET CUFF 18" DUAL PORT SINGLE BLADDER W PLC  (BLACK)

## (undated) DEVICE — SOL IRR POUR H2O 1500ML

## (undated) DEVICE — DRSG WEBRIL 3"

## (undated) DEVICE — GLV 7.5 PROTEXIS (BLUE)

## (undated) DEVICE — POSITIONER FOAM HEAD CRADLE (PINK)